# Patient Record
Sex: FEMALE | Race: BLACK OR AFRICAN AMERICAN | Employment: FULL TIME | URBAN - METROPOLITAN AREA
[De-identification: names, ages, dates, MRNs, and addresses within clinical notes are randomized per-mention and may not be internally consistent; named-entity substitution may affect disease eponyms.]

---

## 2017-06-06 ENCOUNTER — OFFICE VISIT (OUTPATIENT)
Dept: FAMILY MEDICINE CLINIC | Age: 37
End: 2017-06-06
Payer: COMMERCIAL

## 2017-06-06 VITALS
DIASTOLIC BLOOD PRESSURE: 75 MMHG | WEIGHT: 195 LBS | BODY MASS INDEX: 33.89 KG/M2 | TEMPERATURE: 97.9 F | SYSTOLIC BLOOD PRESSURE: 102 MMHG | HEART RATE: 90 BPM

## 2017-06-06 DIAGNOSIS — I10 ESSENTIAL HYPERTENSION: Primary | ICD-10-CM

## 2017-06-06 DIAGNOSIS — F17.200 SMOKER: ICD-10-CM

## 2017-06-06 DIAGNOSIS — E87.6 HYPOKALEMIA: ICD-10-CM

## 2017-06-06 DIAGNOSIS — L60.0 INGROWN RIGHT BIG TOENAIL: ICD-10-CM

## 2017-06-06 PROCEDURE — 99214 OFFICE O/P EST MOD 30 MIN: CPT | Performed by: FAMILY MEDICINE

## 2017-06-06 RX ORDER — LISINOPRIL AND HYDROCHLOROTHIAZIDE 20; 12.5 MG/1; MG/1
1 TABLET ORAL DAILY
Qty: 30 TABLET | Refills: 1 | Status: SHIPPED | OUTPATIENT
Start: 2017-06-06 | End: 2017-10-03 | Stop reason: SDUPTHER

## 2017-06-06 ASSESSMENT — ENCOUNTER SYMPTOMS
ABDOMINAL PAIN: 0
BACK PAIN: 0
SHORTNESS OF BREATH: 0

## 2017-07-11 ENCOUNTER — TELEPHONE (OUTPATIENT)
Dept: FAMILY MEDICINE CLINIC | Age: 37
End: 2017-07-11

## 2017-10-03 ENCOUNTER — OFFICE VISIT (OUTPATIENT)
Dept: FAMILY MEDICINE CLINIC | Age: 37
End: 2017-10-03
Payer: COMMERCIAL

## 2017-10-03 VITALS
WEIGHT: 197.4 LBS | BODY MASS INDEX: 33.7 KG/M2 | TEMPERATURE: 97.5 F | DIASTOLIC BLOOD PRESSURE: 86 MMHG | HEIGHT: 64 IN | HEART RATE: 85 BPM | SYSTOLIC BLOOD PRESSURE: 118 MMHG

## 2017-10-03 DIAGNOSIS — I10 ESSENTIAL HYPERTENSION: Primary | ICD-10-CM

## 2017-10-03 DIAGNOSIS — Z56.89 WORK-RELATED CONDITION: ICD-10-CM

## 2017-10-03 DIAGNOSIS — Z23 NEEDS FLU SHOT: ICD-10-CM

## 2017-10-03 PROCEDURE — 90471 IMMUNIZATION ADMIN: CPT | Performed by: FAMILY MEDICINE

## 2017-10-03 PROCEDURE — 99213 OFFICE O/P EST LOW 20 MIN: CPT | Performed by: FAMILY MEDICINE

## 2017-10-03 PROCEDURE — 90688 IIV4 VACCINE SPLT 0.5 ML IM: CPT | Performed by: FAMILY MEDICINE

## 2017-10-03 RX ORDER — LISINOPRIL AND HYDROCHLOROTHIAZIDE 20; 12.5 MG/1; MG/1
1 TABLET ORAL DAILY
Qty: 30 TABLET | Refills: 1 | Status: SHIPPED | OUTPATIENT
Start: 2017-10-03 | End: 2018-01-05 | Stop reason: SDUPTHER

## 2017-10-03 RX ORDER — IBUPROFEN 800 MG/1
800 TABLET ORAL EVERY 8 HOURS PRN
Qty: 40 TABLET | Refills: 1 | Status: CANCELLED | OUTPATIENT
Start: 2017-10-03

## 2017-10-03 RX ORDER — ACETAMINOPHEN 500 MG
500 TABLET ORAL EVERY 6 HOURS PRN
Qty: 60 TABLET | Refills: 1 | Status: SHIPPED | OUTPATIENT
Start: 2017-10-03 | End: 2019-01-16 | Stop reason: SDUPTHER

## 2017-10-03 ASSESSMENT — PATIENT HEALTH QUESTIONNAIRE - PHQ9
SUM OF ALL RESPONSES TO PHQ9 QUESTIONS 1 & 2: 0
2. FEELING DOWN, DEPRESSED OR HOPELESS: 0
1. LITTLE INTEREST OR PLEASURE IN DOING THINGS: 0
SUM OF ALL RESPONSES TO PHQ QUESTIONS 1-9: 0

## 2017-10-03 ASSESSMENT — ENCOUNTER SYMPTOMS
DIARRHEA: 0
NAUSEA: 0
WHEEZING: 0
ABDOMINAL PAIN: 0
CONSTIPATION: 0
VOMITING: 0
SHORTNESS OF BREATH: 0
BLURRED VISION: 0
COUGH: 0

## 2017-10-03 NOTE — MR AVS SNAPSHOT
After Visit Summary             Brittney Lawson   10/3/2017 10:15 AM   Office Visit    Description:  Female : 1980   Provider:  Shavon Phelps MD   Department:  [facility]              Your Follow-Up and Future Appointments         Below is a list of your follow-up and future appointments. This may not be a complete list as you may have made appointments directly with providers that we are not aware of or your providers may have made some for you. Please call your providers to confirm appointments. It is important to keep your appointments. Please bring your current insurance card, photo ID, co-pay, and all medication bottles to your appointment. If self-pay, payment is expected at the time of service. Your To-Do List     Follow-Up    Return in about 4 months (around 2/3/2018) for follow up HTN. Information from Your Visit        Department     Name Address Phone Fax    [address] [phone]      You Were Seen for:         Comments    Essential hypertension   [[id number]]         Vital Signs     Blood Pressure Pulse Temperature Height    118/86 (Site: Left Arm, Position: Sitting, Cuff Size: Medium Adult) 85 97.5 °F (36.4 °C) (Temporal) 5' 3.78\" (1.62 m)    Weight Last Menstrual Period Body Mass Index Smoking Status    197 lb 6.4 oz (89.5 kg) 2015 34.12 kg/m2 Current Some Day Smoker      Additional Information about your Body Mass Index (BMI)           Your BMI as listed above is considered obese (30 or more). BMI is an estimate of body fat, calculated from your height and weight. The higher your BMI, the greater your risk of heart disease, high blood pressure, type 2 diabetes, stroke, gallstones, arthritis, sleep apnea, and certain cancers. BMI is not perfect. It may overestimate body fat in athletes and people who are more muscular.   Even a small weight loss (between 5 and 10 percent of your current weight) by decreasing your calorie intake and becoming more physically active will help lower your risk of developing or worsening diseases associated with obesity. Learn more at: [email or web address]          Instructions    Thank you for letting us take care of you today. We hope all your questions were addressed. If a question was overlooked or something else comes to mind after you return home, please contact a member of your Care Team listed below. Please make sure you have a routine office visit set up to follow-up on [address]. Your Care Team at [place] [address] is Team #2  Krystal Stanley DO (Faculty)  Josiah Naylor MD (Resindent)  Eryn Tinoco MD (Resident)  Samir Connell MD (Resident)  Doug Gimenez MD (Resident)  Susan Colmenares MD (Resident)  Moe Cisneros LPRON Whitten., [facility]  Lillian Harris, [facility]  Cuate Coyne ([address] [facility])  Kasia Jefferson RN, ([address] [facility])  Henrry Washington, Ph.D., (Behavioral Services)  Chun Bhandari [facility] (Clinical Pharmacist)     Office phone number: [phone]    If you need to get in right away due to illness, please be advised we have \"Same Day\" appointments available Monday-Friday. Please call us at [phone] option #1 to schedule your \"Same Day\" appointment. Today's Medication Changes          These changes are accurate as of: 10/3/17 11:08 AM.  If you have any questions, ask your nurse or doctor. START taking these medications           acetaminophen 500 MG tablet   Commonly known as:  APAP EXTRA STRENGTH   Instructions:   Take 1 tablet by mouth every 6 hours as needed for Pain   Quantity:  60 tablet   Refills:  1   Started by:  Sapphire Handy MD            Where to Get Your Medications      These medications were sent to [facility], [address] [place] [address] [address] [place] [address]  [address], [address] [address] Phone:  [phone]     acetaminophen 500 MG tablet    lisinopril-hydrochlorothiazide 20-12.5 MG per tablet               Your Current Medications Are              lisinopril-hydrochlorothiazide (PRINZIDE;ZESTORETIC) 20-12.5 MG per tablet Take 1 tablet by mouth daily    acetaminophen (APAP EXTRA STRENGTH) 500 MG tablet Take 1 tablet by mouth every 6 hours as needed for Pain    ibuprofen (ADVIL;MOTRIN) 800 MG tablet Take 1 tablet by mouth every 8 hours as needed for Pain      Allergies           No Known Allergies      We Ordered/Performed the following           INFLUENZA, QUADV, 6 MO AND OLDER, IM, MDV, 0.5ML (FLULAVAL QUADV)          Additional Information        Basic Information     Date Of Birth Sex Race Ethnicity Preferred Language    1980 Female Black Non-/Non  English      Problem List as of 10/3/2017  Date Reviewed: 8/23/2016                Enlarged uterus    Uterine Polyps    Abnormal uterine bleeding (AUB)    Elevated blood pressure reading without diagnosis of hypertension    Fibroids    Dysmenorrhea    Thyromegaly    Obesity (BMI 30.0-34. 9)    Hypokalemia    Endometriosis, stage 4    Tobacco use    HTN (hypertension)    Menometrorrhagia      Immunizations as of 10/3/2017     Name Date    Influenza Virus Vaccine 11/24/2015    Tdap (Boostrix, Adacel) 3/17/2015      Preventive Care        Date Due    Yearly Flu Vaccine (1) 9/1/2017    Pneumococcal Vaccine - Pneumovax for adults aged 19-64 years with: chronic heart disease, chronic lung disease, diabetes mellitus, alcoholism, chronic liver disease, or cigarette smoking. (1 of 1 - PPSV23) 12/6/2017 (Originally 1/23/2000)    HIV screening is recommended for all people regardless of risk factors  aged 15-65 years at least once (lifetime) who have never been HIV tested.  12/6/2017 (Originally 1/23/1996)    Tetanus Combination Vaccine (2 - Td) 3/17/2025            [facility] Signup

## 2017-10-03 NOTE — PROGRESS NOTES
Visit Information    Have you changed or started any medications since your last visit including any over-the-counter medicines, vitamins, or herbal medicines? no   Have you stopped taking any of your medications? Is so, why? -  no  Are you having any side effects from any of your medications? - no    Have you seen any other physician or provider since your last visit?  no   Have you had any other diagnostic tests since your last visit?  no   Have you been seen in the emergency room and/or had an admission in a hospital since we last saw you?  yes [facility]   Have you had your routine dental cleaning in the past 6 months?  yes - May     Do you have an active MyChart account? If no, what is the barrier?   No: Declined    Patient Care Team:  Nelson Miranda MD as PCP - General (Family Medicine)  Al Echeverria MD as Consulting Physician (Obstetrics & Gynecology)    Medical History Review  Past Medical, Family, and Social History reviewed and does not contribute to the patient presenting condition    Health Maintenance   Topic Date Due    Flu vaccine (1) 09/01/2017    Pneumococcal med risk (1 of 1 - PPSV23) 12/06/2017 (Originally 1/23/2000)    HIV screen  12/06/2017 (Originally 1/23/1996)    DTaP/Tdap/Td vaccine (2 - Td) 03/17/2025

## 2017-10-03 NOTE — PROGRESS NOTES
Attending Physician Statement  I have discussed the care of Terry Ferrara, 40 y.o. female,including pertinent history and exam findings,  with the resident Dr. Macy Benítez MD.  History:  Chief Complaint   Patient presents with    Muscle Pain     Generlized pain all over body. Patient states her job is very physical.      41 yo female here with complaints of generalized body aches. Reports excessive work hours and physical exertion. I have reviewed the key elements of the encounter with the resident. Examination was done by resident as documented in residents note. BP Readings from Last 3 Encounters:   10/03/17 118/86   06/06/17 102/75   08/23/16 116/78     /86 (Site: Left Arm, Position: Sitting, Cuff Size: Medium Adult)  Pulse 85  Temp 97.5 °F (36.4 °C) (Temporal)   Ht 5' 3.78\" (1.62 m)  Wt 197 lb 6.4 oz (89.5 kg)  LMP 05/17/2015  BMI 34.12 kg/m2  Lab Results   Component Value Date    WBC 10.9 11/20/2015    HGB 13.1 11/20/2015    HCT 40.1 11/20/2015     11/20/2015    ALT 18 02/06/2015    AST 15 02/06/2015     (H) 11/20/2015    K 3.5 (L) 11/20/2015     11/20/2015    CREATININE 0.54 11/20/2015    BUN 14 11/20/2015    CO2 27 11/20/2015    TSH 1.17 08/12/2014    INR 0.9 08/12/2014    LABA1C 5.3 08/12/2014    LABMICR 14 11/20/2015     Lab Results   Component Value Date    CALCIUM 10.1 11/20/2015     No results found for: LDLCALC, LDLCHOLESTEROL, LDLDIRECT  I agree with the assessment, plan and diagnosis of   1. Essential hypertension  lisinopril-hydrochlorothiazide (PRINZIDE;ZESTORETIC) 20-12.5 MG per tablet   2. Needs flu shot     3. Work-related condition       I agree with  orders as documented by the resident. Recommendations:   Patient's exam is benign, counseled regarding supportive treatment and follow up if sx persist to pursue labs/ work up. BP is controlled, continue current regimen and provide refills. Return in about 4 months (around 2/3/2018) for follow up HTN. ([facility] ) Dr. Leisa Mckinney MD

## 2017-10-03 NOTE — PROGRESS NOTES
Subjective:    Anselmo Fritz is a 40 y.o. female with  has a past medical history of Abdominal pain; Fibroids; HTN (hypertension); Menometrorrhagia; and Obesity (BMI 30.0-34.9). HPI Patient is 40year old female with a past medical history of HTN who presents with pain in her hands, legs, feet and lower back. 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 She states she has been working long hours for the past 1 month, including 19 consecutive days. No reported trauma. She has had mild relief with OTC pain medications such as Tylenol and Alieve. Patient is also compliant with her BP medications. No reported symptoms of headaches, visual disturbances, chest pain or SOB. Review of Systems   Constitutional: Negative for chills and fever. Eyes: Negative for blurred vision. Respiratory: Negative for cough, shortness of breath and wheezing. Cardiovascular: Negative for chest pain. Gastrointestinal: Negative for abdominal pain, constipation, diarrhea, nausea and vomiting. Musculoskeletal: Positive for joint pain. Neurological: Negative for dizziness and headaches. Objective:    /86 (Site: Left Arm, Position: Sitting, Cuff Size: Medium Adult)  Pulse 85  Temp 97.5 °F (36.4 °C) (Temporal)   Ht 5' 3.78\" (1.62 m)  Wt 197 lb 6.4 oz (89.5 kg)  LMP 05/17/2015  BMI 34.12 kg/m2   BP Readings from Last 3 Encounters:   10/03/17 118/86   06/06/17 102/75   08/23/16 116/78     Physical Exam   Constitutional: She is oriented to person, place, and time and well-developed, well-nourished, and in no distress. No distress. HENT:   Head: Normocephalic and atraumatic. Eyes: Pupils are equal, round, and reactive to light. Cardiovascular: Normal rate, regular rhythm, normal heart sounds and intact distal pulses. No murmur heard. Pulmonary/Chest: Effort normal and breath sounds normal. She has no wheezes. Lymphadenopathy:     She has no cervical adenopathy.    Neurological: She is alert and oriented to person, place, and time. Skin: She is not diaphoretic. Nursing note and vitals reviewed. BP Readings from Last 3 Encounters:   10/03/17 118/86   06/06/17 102/75   08/23/16 116/78     /86 (Site: Left Arm, Position: Sitting, Cuff Size: Medium Adult)  Pulse 85  Temp 97.5 °F (36.4 °C) (Temporal)   Ht 5' 3.78\" (1.62 m)  Wt 197 lb 6.4 oz (89.5 kg)  LMP 05/17/2015  BMI 34.12 kg/m2  Lab Results   Component Value Date    WBC 10.9 11/20/2015    HGB 13.1 11/20/2015    HCT 40.1 11/20/2015     11/20/2015    ALT 18 02/06/2015    AST 15 02/06/2015     (H) 11/20/2015    K 3.5 (L) 11/20/2015     11/20/2015    CREATININE 0.54 11/20/2015    BUN 14 11/20/2015    CO2 27 11/20/2015    TSH 1.17 08/12/2014    INR 0.9 08/12/2014    LABA1C 5.3 08/12/2014    LABMICR 14 11/20/2015     Lab Results   Component Value Date    CALCIUM 10.1 11/20/2015     No results found for: LDLCALC, LDLCHOLESTEROL, LDLDIRECT    Assessment and Plan:    1. Essential hypertension  - condition stable; continue current medical management with medication listed below  - lisinopril-hydrochlorothiazide (PRINZIDE;ZESTORETIC) 20-12.5 MG per tablet; Take 1 tablet by mouth daily  Dispense: 30 tablet; Refill: 1    2. Needs flu shot  - flu shot given in office today    3. Work-related condition  - will treat pain with prn Tylenol ES; avoid NSAIDs due to HTN  - work restriction is another option which was discussed with the patient; she states she will consider it      Fermin Habermann received counseling on the following healthy behaviors: nutrition, exercise and medication adherence  Reviewed prior labs and health maintenance. Continue current medications, diet and exercise. Discussed use, benefit, and side effects of prescribed medications. Barriers to medication compliance addressed. Patient given educational materials - see patient instructions. All patient questions answered. Patient voiced understanding.      Requested Prescriptions Signed Prescriptions Disp Refills    lisinopril-hydrochlorothiazide (PRINZIDE;ZESTORETIC) 20-12.5 MG per tablet 30 tablet 1     Sig: Take 1 tablet by mouth daily    acetaminophen (APAP EXTRA STRENGTH) 500 MG tablet 60 tablet 1     Sig: Take 1 tablet by mouth every 6 hours as needed for Pain       Medications Discontinued During This Encounter   Medication Reason    lisinopril-hydrochlorothiazide (PRINZIDE;ZESTORETIC) 20-12.5 MG per tablet Reorder

## 2018-01-05 DIAGNOSIS — I10 ESSENTIAL HYPERTENSION: ICD-10-CM

## 2018-01-05 RX ORDER — LISINOPRIL AND HYDROCHLOROTHIAZIDE 20; 12.5 MG/1; MG/1
TABLET ORAL
Qty: 30 TABLET | Refills: 0 | Status: SHIPPED | OUTPATIENT
Start: 2018-01-05 | End: 2018-02-20 | Stop reason: SDUPTHER

## 2018-02-20 ENCOUNTER — OFFICE VISIT (OUTPATIENT)
Dept: FAMILY MEDICINE CLINIC | Age: 38
End: 2018-02-20
Payer: COMMERCIAL

## 2018-02-20 VITALS
SYSTOLIC BLOOD PRESSURE: 118 MMHG | WEIGHT: 201 LBS | HEIGHT: 64 IN | HEART RATE: 68 BPM | BODY MASS INDEX: 34.31 KG/M2 | DIASTOLIC BLOOD PRESSURE: 80 MMHG | TEMPERATURE: 97.7 F

## 2018-02-20 DIAGNOSIS — Z23 NEED FOR VACCINATION: ICD-10-CM

## 2018-02-20 DIAGNOSIS — Z77.011 EXPOSURE TO LEAD: ICD-10-CM

## 2018-02-20 DIAGNOSIS — R53.83 FATIGUE, UNSPECIFIED TYPE: ICD-10-CM

## 2018-02-20 DIAGNOSIS — I10 ESSENTIAL HYPERTENSION: Primary | ICD-10-CM

## 2018-02-20 DIAGNOSIS — M79.642 PAIN IN BOTH HANDS: ICD-10-CM

## 2018-02-20 DIAGNOSIS — M79.641 PAIN IN BOTH HANDS: ICD-10-CM

## 2018-02-20 PROCEDURE — 90471 IMMUNIZATION ADMIN: CPT | Performed by: FAMILY MEDICINE

## 2018-02-20 PROCEDURE — G8427 DOCREV CUR MEDS BY ELIG CLIN: HCPCS | Performed by: FAMILY MEDICINE

## 2018-02-20 PROCEDURE — G8417 CALC BMI ABV UP PARAM F/U: HCPCS | Performed by: FAMILY MEDICINE

## 2018-02-20 PROCEDURE — 4004F PT TOBACCO SCREEN RCVD TLK: CPT | Performed by: FAMILY MEDICINE

## 2018-02-20 PROCEDURE — 90732 PPSV23 VACC 2 YRS+ SUBQ/IM: CPT | Performed by: FAMILY MEDICINE

## 2018-02-20 PROCEDURE — 99213 OFFICE O/P EST LOW 20 MIN: CPT | Performed by: FAMILY MEDICINE

## 2018-02-20 PROCEDURE — G8484 FLU IMMUNIZE NO ADMIN: HCPCS | Performed by: FAMILY MEDICINE

## 2018-02-20 RX ORDER — LISINOPRIL AND HYDROCHLOROTHIAZIDE 20; 12.5 MG/1; MG/1
TABLET ORAL
Qty: 30 TABLET | Refills: 3 | Status: SHIPPED | OUTPATIENT
Start: 2018-02-20 | End: 2018-07-28 | Stop reason: SDUPTHER

## 2018-02-20 ASSESSMENT — ENCOUNTER SYMPTOMS
VOMITING: 0
NAUSEA: 0
COUGH: 0
BLURRED VISION: 0
SHORTNESS OF BREATH: 0
ABDOMINAL PAIN: 0
DIARRHEA: 0
CONSTIPATION: 0
WHEEZING: 0

## 2018-02-20 NOTE — PROGRESS NOTES
sounds and intact distal pulses. No murmur heard. Pulmonary/Chest: Effort normal and breath sounds normal. She has no wheezes. Musculoskeletal:        Right hand: She exhibits bony tenderness. Normal sensation noted. Normal strength noted. Left hand: She exhibits bony tenderness. Normal sensation noted. Normal strength noted. Lymphadenopathy:     She has no cervical adenopathy. Neurological: She is alert and oriented to person, place, and time. Skin: She is not diaphoretic. Nursing note and vitals reviewed. BP Readings from Last 3 Encounters:   02/20/18 118/80   10/03/17 118/86   06/06/17 102/75     /80 (Site: Left Arm, Position: Sitting, Cuff Size: Medium Adult) Comment: Machine  Pulse 68   Temp 97.7 °F (36.5 °C) (Temporal)   Ht 5' 3.78\" (1.62 m)   Wt 201 lb (91.2 kg)   LMP 05/17/2015 Comment: blood hcg negative today  BMI 34.74 kg/m²   Lab Results   Component Value Date    WBC 10.9 11/20/2015    HGB 13.1 11/20/2015    HCT 40.1 11/20/2015     11/20/2015    ALT 18 02/06/2015    AST 15 02/06/2015     (H) 11/20/2015    K 3.5 (L) 11/20/2015     11/20/2015    CREATININE 0.54 11/20/2015    BUN 14 11/20/2015    CO2 27 11/20/2015    TSH 1.17 08/12/2014    INR 0.9 08/12/2014    LABA1C 5.3 08/12/2014    LABMICR 14 11/20/2015     Lab Results   Component Value Date    CALCIUM 10.1 11/20/2015     No results found for: LDLCALC, LDLCHOLESTEROL, LDLDIRECT    Assessment and Plan:    1. Essential hypertension  Blood pressure goal for this patient is less than 130/80. Blood pressure is currently at goal; continue current medical management with Zestoretic 20-12 0.5 mg daily. We'll order BMP to check serum creatinine and serum potassium. 2. Pain in both hands  We'll order x-rays of both hands and refer patient to physical therapy. 3. Fatigue, unspecified type  CBC from November 2015 shows normal hemoglobin but microcytosis.   Iron studies done at that time she were within

## 2018-02-20 NOTE — PATIENT INSTRUCTIONS
Thank you for letting us take care of you today. We hope all your questions were addressed. If a question was overlooked or something else comes to mind after you return home, please contact a member of your Care Team listed below. Please make sure you have a routine office visit set up to follow-up on [address]. Your Care Team at [place] [address] is Team #2  Patrick Peterson DO (Faculty)  Linden Stone MD (Resident)  Piyush Nguyen MD (Resident)  Catalino Coto MD (Resident)  Rivka Renteria MD (Resident)  Fe Royal MD (Resident)  STACY Tucker., A  Kurt Olvera, GABINO Rodriguez ([address] [facility])  Cher Ruiz RN, ([address] [facility])  Zoila Ibanez, Ph.D., (Behavioral Services)  George Walker [facility] (Clinical Pharmacist)     Office phone number: [phone]    If you need to get in right away due to illness, please be advised we have \"Same Day\" appointments available Monday-Friday. Please call us at [phone] option #1 to schedule your \"Same Day\" appointment. OctoberPatient Education        High Blood Pressure: Care Instructions  Your Care Instructions    If your blood pressure is usually above 140/90, you have high blood pressure, or hypertension. That means the top number is 140 or higher or the bottom number is 90 or higher, or both. Despite what a lot of people think, high blood pressure usually doesn't cause headaches or make you feel dizzy or lightheaded. It usually has no symptoms. But it does increase your risk for heart attack, stroke, and kidney or eye damage. The higher your blood pressure, the more your risk increases. Your doctor will give you a goal for your blood pressure. Your goal will be based on your health and your age. An example of a goal is to keep your blood pressure below 140/90. Lifestyle changes, such as eating healthy and being active, are always important to help lower blood pressure.  You might also take medicine to reach your grains, and low-fat dairy products. · Lower the amount of saturated fat in your diet. Saturated fat is found in animal products such as milk, cheese, and meat. Limiting these foods may help you lose weight and also lower your risk for heart disease. · Do not smoke. Smoking increases your risk for heart attack and stroke. If you need help quitting, talk to your doctor about stop-smoking programs and medicines. These can increase your chances of quitting for good. When should you call for help? Call 911 anytime you think you may need emergency care. This may mean having symptoms that suggest that your blood pressure is causing a serious heart or blood vessel problem. Your blood pressure may be over 180/110. ? For example, call 911 if:  ? · You have symptoms of a heart attack. These may include:  ¨ Chest pain or pressure, or a strange feeling in the chest.  ¨ Sweating. ¨ Shortness of breath. ¨ Nausea or vomiting. ¨ Pain, pressure, or a strange feeling in the back, neck, jaw, or upper belly or in one or both shoulders or arms. ¨ Lightheadedness or sudden weakness. ¨ A fast or irregular heartbeat. ? · You have symptoms of a stroke. These may include:  ¨ Sudden numbness, tingling, weakness, or loss of movement in your face, arm, or leg, especially on only one side of your body. ¨ Sudden vision changes. ¨ Sudden trouble speaking. ¨ Sudden confusion or trouble understanding simple statements. ¨ Sudden problems with walking or balance. ¨ A sudden, severe headache that is different from past headaches. ? · You have severe back or belly pain. ?Do not wait until your blood pressure comes down on its own. Get help right away. ?Call your doctor now or seek immediate care if:  ? · Your blood pressure is much higher than normal (such as 180/110 or higher), but you don't have symptoms. ? · You think high blood pressure is causing symptoms, such as:  ¨ Severe headache. ¨ Blurry vision. ? Watch closely for changes in your health, and be sure to contact your doctor if:  ? · Your blood pressure measures 140/90 or higher at least 2 times. That means the top number is 140 or higher or the bottom number is 90 or higher, or both. ? · You think you may be having side effects from your blood pressure medicine. ? · Your blood pressure is usually normal, but it goes above normal at least 2 times. Where can you learn more? Go to https://[email or web address].[email or web address] org and sign in to your [employer] account. Enter V451 in the It[employer]ix box to learn more about \"High Blood Pressure: Care Instructions. \"     If you do not have an account, please click on the \"Sign Up Now\" link. Current as of: Nydia 10, 2017  Content Version: 11.5  © 3527-9173 Healthwise, Incorporated. Care instructions adapted under license by [facility] ([facility]). If you have questions about a medical condition or this instruction, always ask your healthcare professional. [address] any warranty or liability for your use of this information.

## 2018-02-21 ENCOUNTER — TELEPHONE (OUTPATIENT)
Dept: FAMILY MEDICINE CLINIC | Age: 38
End: 2018-02-21

## 2018-02-21 DIAGNOSIS — M79.641 PAIN IN BOTH HANDS: Primary | ICD-10-CM

## 2018-02-21 DIAGNOSIS — M79.642 PAIN IN BOTH HANDS: Primary | ICD-10-CM

## 2018-07-28 DIAGNOSIS — I10 ESSENTIAL HYPERTENSION: ICD-10-CM

## 2018-07-30 RX ORDER — LISINOPRIL AND HYDROCHLOROTHIAZIDE 20; 12.5 MG/1; MG/1
TABLET ORAL
Qty: 30 TABLET | Refills: 0 | Status: SHIPPED | OUTPATIENT
Start: 2018-07-30 | End: 2018-10-23 | Stop reason: SDUPTHER

## 2018-07-30 NOTE — TELEPHONE ENCOUNTER
Patient needs to make an appointment. Not seen since [place]. No more refills until seen in office. Please inform patient.  Thanks and have a nice day. - Dr. Shawanda Chang

## 2018-07-30 NOTE — TELEPHONE ENCOUNTER
Please address the medication refill and close the encounter. If I can be of assistance, please route to the applicable pool. Thank you. Next Visit Date:  No future appointments. Health Maintenance   Topic Date Due    HIV screen  01/23/1996    Potassium monitoring  11/20/2016    Creatinine monitoring  11/20/2016    Flu vaccine (1) 09/01/2018    DTaP/Tdap/Td vaccine (2 - Td) 03/17/2025    Pneumococcal med risk  Completed       Hemoglobin A1C (%)   Date Value   08/12/2014 5.3             ( goal A1C is < 7)   Microalb/Crt. Ratio (mcg/mg creat)   Date Value   11/20/2015 14     No results found for: LDLCHOLESTEROL, LDLCALC    (goal LDL is <100)   AST (U/L)   Date Value   02/06/2015 15     ALT (U/L)   Date Value   02/06/2015 18     BUN (mg/dL)   Date Value   11/20/2015 14     BP Readings from Last 3 Encounters:   02/20/18 118/80   10/03/17 118/86   06/06/17 102/75          (goal 120/80)    All Future Testing planned in [facility]  Lab Frequency Next Occurrence   CBC Auto Differential Once 69/93/8247   Basic Metabolic Panel Once 56/67/7860   XR HAND RIGHT (MIN 3 VIEWS) Once 07/17/2018   Lead, Blood Once 02/20/2019   XR HAND LEFT (MIN 3 VIEWS) Once 07/17/2018               Patient Active Problem List:     Fibroids     Dysmenorrhea     Menometrorrhagia     HTN (hypertension)     Tobacco use     Obesity (BMI 30.0-34. 9)     Enlarged uterus     Uterine Polyps     Abnormal uterine bleeding (AUB)     Hypokalemia     Endometriosis, stage 4     Thyromegaly

## 2018-10-23 ENCOUNTER — TELEPHONE (OUTPATIENT)
Dept: ADMINISTRATIVE | Age: 38
End: 2018-10-23

## 2018-10-23 DIAGNOSIS — I10 ESSENTIAL HYPERTENSION: ICD-10-CM

## 2018-10-23 RX ORDER — LISINOPRIL AND HYDROCHLOROTHIAZIDE 20; 12.5 MG/1; MG/1
TABLET ORAL
Qty: 30 TABLET | Refills: 0 | Status: SHIPPED | OUTPATIENT
Start: 2018-10-23 | End: 2018-12-15 | Stop reason: SDUPTHER

## 2018-10-23 NOTE — TELEPHONE ENCOUNTER
Patient called, set up appointment for 11/7. States she is out of her blood pressure meds and is having headaches as a result. Asked that office approve script to get her through to appointment date. Please contact patient and let her know if script can be filled. Her phone is [phone].

## 2018-11-07 ENCOUNTER — OFFICE VISIT (OUTPATIENT)
Dept: FAMILY MEDICINE CLINIC | Age: 38
End: 2018-11-07
Payer: COMMERCIAL

## 2018-11-07 VITALS
WEIGHT: 192.2 LBS | HEIGHT: 63 IN | HEART RATE: 86 BPM | BODY MASS INDEX: 34.05 KG/M2 | SYSTOLIC BLOOD PRESSURE: 120 MMHG | DIASTOLIC BLOOD PRESSURE: 90 MMHG | TEMPERATURE: 97.4 F

## 2018-11-07 DIAGNOSIS — G56.03 BILATERAL CARPAL TUNNEL SYNDROME: Primary | ICD-10-CM

## 2018-11-07 DIAGNOSIS — Z23 NEED FOR VACCINATION: ICD-10-CM

## 2018-11-07 PROCEDURE — 99213 OFFICE O/P EST LOW 20 MIN: CPT | Performed by: FAMILY MEDICINE

## 2018-11-07 PROCEDURE — 90686 IIV4 VACC NO PRSV 0.5 ML IM: CPT | Performed by: FAMILY MEDICINE

## 2018-11-07 ASSESSMENT — ENCOUNTER SYMPTOMS
BACK PAIN: 1
SHORTNESS OF BREATH: 0
ABDOMINAL PAIN: 0
VOMITING: 0
DIARRHEA: 0
COUGH: 0
CONSTIPATION: 0
SORE THROAT: 0
NAUSEA: 0

## 2018-11-07 ASSESSMENT — PATIENT HEALTH QUESTIONNAIRE - PHQ9
SUM OF ALL RESPONSES TO PHQ QUESTIONS 1-9: 0
SUM OF ALL RESPONSES TO PHQ QUESTIONS 1-9: 0
2. FEELING DOWN, DEPRESSED OR HOPELESS: 0
SUM OF ALL RESPONSES TO PHQ9 QUESTIONS 1 & 2: 0
1. LITTLE INTEREST OR PLEASURE IN DOING THINGS: 0

## 2018-11-07 NOTE — PROGRESS NOTES
Subjective:   Jinny Skaggs is a 41 y.o. female with  has a past medical history of Abdominal pain; Bilateral carpal tunnel syndrome; Fibroids; HTN (hypertension); Menometrorrhagia; and Obesity (BMI 30.0-34.9). HPI   Patient presents with several pain complaints. Main concern is pain, numbness and tingling of both hands. Patient believes it is work related. Numbness and tingling wake her up from sleep at night. No reported injury or trauma. Patient also having pain in neck and back, also most likely work related. Review of Systems   Constitutional: Negative for chills and fever. HENT: Negative for sore throat. Eyes: Negative for visual disturbance. Respiratory: Negative for cough and shortness of breath. Cardiovascular: Negative for chest pain. Gastrointestinal: Negative for abdominal pain, constipation, diarrhea, nausea and vomiting. Genitourinary: Negative for dysuria. Musculoskeletal: Positive for back pain and neck pain. Numbness and tingling of both hands and wrists   Neurological: Negative for dizziness and headaches. Objective:    BP (!) 120/90 (Site: Left Upper Arm, Position: Sitting, Cuff Size: Medium Adult)   Pulse 86   Temp 97.4 °F (36.3 °C)   Ht 5' 3\" (1.6 m)   Wt 192 lb 3.2 oz (87.2 kg)   LMP 05/17/2015 Comment: blood hcg negative today  BMI 34.05 kg/m²    BP Readings from Last 3 Encounters:   11/07/18 (!) 120/90   02/20/18 118/80   10/03/17 118/86     Physical Exam   Constitutional: She is oriented to person, place, and time. No distress. HENT:   Head: Normocephalic and atraumatic. Cardiovascular: Normal rate, regular rhythm, normal heart sounds and intact distal pulses. No murmur heard. Pulmonary/Chest: Effort normal and breath sounds normal. She has no wheezes. Musculoskeletal: She exhibits no edema. Lymphadenopathy:     She has no cervical adenopathy. Neurological: She is alert and oriented to person, place, and time.    Skin: Capillary

## 2018-12-15 DIAGNOSIS — I10 ESSENTIAL HYPERTENSION: ICD-10-CM

## 2018-12-18 RX ORDER — LISINOPRIL AND HYDROCHLOROTHIAZIDE 20; 12.5 MG/1; MG/1
TABLET ORAL
Qty: 30 TABLET | Refills: 5 | Status: SHIPPED | OUTPATIENT
Start: 2018-12-18 | End: 2019-10-24 | Stop reason: SDUPTHER

## 2019-01-16 ENCOUNTER — OFFICE VISIT (OUTPATIENT)
Dept: FAMILY MEDICINE CLINIC | Age: 39
End: 2019-01-16
Payer: COMMERCIAL

## 2019-01-16 VITALS
SYSTOLIC BLOOD PRESSURE: 120 MMHG | DIASTOLIC BLOOD PRESSURE: 86 MMHG | WEIGHT: 194 LBS | HEART RATE: 86 BPM | BODY MASS INDEX: 34.38 KG/M2 | TEMPERATURE: 98 F | HEIGHT: 63 IN

## 2019-01-16 DIAGNOSIS — I10 ESSENTIAL HYPERTENSION: ICD-10-CM

## 2019-01-16 DIAGNOSIS — M25.551 RIGHT HIP PAIN: ICD-10-CM

## 2019-01-16 DIAGNOSIS — Z11.4 ENCOUNTER FOR SCREENING FOR HIV: ICD-10-CM

## 2019-01-16 DIAGNOSIS — G56.03 BILATERAL CARPAL TUNNEL SYNDROME: Primary | ICD-10-CM

## 2019-01-16 PROCEDURE — 99213 OFFICE O/P EST LOW 20 MIN: CPT | Performed by: FAMILY MEDICINE

## 2019-01-16 PROCEDURE — 99211 OFF/OP EST MAY X REQ PHY/QHP: CPT | Performed by: FAMILY MEDICINE

## 2019-01-16 RX ORDER — ACETAMINOPHEN 500 MG
500 TABLET ORAL EVERY 6 HOURS PRN
Qty: 60 TABLET | Refills: 1 | Status: SHIPPED | OUTPATIENT
Start: 2019-01-16 | End: 2022-03-03

## 2019-01-16 ASSESSMENT — ENCOUNTER SYMPTOMS
SORE THROAT: 0
DIARRHEA: 0
NAUSEA: 0
SHORTNESS OF BREATH: 0
CONSTIPATION: 0
VOMITING: 0
ABDOMINAL PAIN: 0
BACK PAIN: 0
COUGH: 0

## 2019-04-16 ENCOUNTER — HOSPITAL ENCOUNTER (OUTPATIENT)
Dept: OCCUPATIONAL THERAPY | Age: 39
Setting detail: THERAPIES SERIES
Discharge: HOME | End: 2019-04-16
Payer: COMMERCIAL

## 2019-04-16 NOTE — SIGNIFICANT EVENT
[x] [address] Occupational Therapy       [address] [facility], 1st [address]       Phone: [phone]       Fax: [phone] [] [facility] at [address] [place] [address]. [place], [place]       Phone: [phone]       Fax: [phone]          Occupational Therapy Cancel/No Show note    Date: 2019  Patient: Nicole Welsh  : 1980  MRN: [id number]  Cancels/No Shows to date:     For today's appointment patient:  []  Cancelled  []  Rescheduled appointment  [x]  No-show     Reason given by patient:  []  Patient ill  []  Conflicting appointment  []  No transportation    []  Conflict with work  []  No reason given  []  Other:     Comments: No showed for evaluation this date.       Electronically signed by: ADOLFO Banegas/MICHELLE, TIFFANY

## 2019-07-11 ENCOUNTER — OFFICE VISIT (OUTPATIENT)
Dept: FAMILY MEDICINE CLINIC | Age: 39
End: 2019-07-11
Payer: COMMERCIAL

## 2019-07-11 VITALS
HEART RATE: 81 BPM | TEMPERATURE: 96.9 F | BODY MASS INDEX: 35.54 KG/M2 | HEIGHT: 63 IN | WEIGHT: 200.6 LBS | SYSTOLIC BLOOD PRESSURE: 118 MMHG | DIASTOLIC BLOOD PRESSURE: 86 MMHG

## 2019-07-11 DIAGNOSIS — G56.03 BILATERAL CARPAL TUNNEL SYNDROME: Primary | ICD-10-CM

## 2019-07-11 DIAGNOSIS — M25.562 LEFT KNEE PAIN, UNSPECIFIED CHRONICITY: ICD-10-CM

## 2019-07-11 DIAGNOSIS — Z00.00 HEALTH CARE MAINTENANCE: ICD-10-CM

## 2019-07-11 PROCEDURE — 99213 OFFICE O/P EST LOW 20 MIN: CPT | Performed by: STUDENT IN AN ORGANIZED HEALTH CARE EDUCATION/TRAINING PROGRAM

## 2019-07-11 RX ORDER — GABAPENTIN 300 MG/1
300 CAPSULE ORAL 3 TIMES DAILY
Qty: 270 CAPSULE | Refills: 1 | Status: SHIPPED | OUTPATIENT
Start: 2019-07-11 | End: 2019-10-24

## 2019-07-11 RX ORDER — IBUPROFEN 600 MG/1
600 TABLET ORAL EVERY 8 HOURS PRN
Qty: 90 TABLET | Refills: 0 | Status: SHIPPED | OUTPATIENT
Start: 2019-07-11 | End: 2019-08-14 | Stop reason: SDUPTHER

## 2019-07-11 ASSESSMENT — ENCOUNTER SYMPTOMS
SORE THROAT: 0
VOMITING: 0
COUGH: 0
DIARRHEA: 0
ABDOMINAL PAIN: 0
CHEST TIGHTNESS: 0
SHORTNESS OF BREATH: 0
NAUSEA: 0
CONSTIPATION: 0

## 2019-08-14 DIAGNOSIS — M25.562 LEFT KNEE PAIN, UNSPECIFIED CHRONICITY: ICD-10-CM

## 2019-08-15 RX ORDER — IBUPROFEN 600 MG/1
TABLET ORAL
Qty: 90 TABLET | Refills: 0 | Status: SHIPPED | OUTPATIENT
Start: 2019-08-15 | End: 2019-10-24

## 2019-08-18 DIAGNOSIS — M25.562 LEFT KNEE PAIN, UNSPECIFIED CHRONICITY: ICD-10-CM

## 2019-08-19 RX ORDER — IBUPROFEN 600 MG/1
TABLET ORAL
Qty: 90 TABLET | Refills: 0 | Status: SHIPPED | OUTPATIENT
Start: 2019-08-19 | End: 2020-09-10 | Stop reason: SDUPTHER

## 2019-10-24 ENCOUNTER — OFFICE VISIT (OUTPATIENT)
Dept: FAMILY MEDICINE CLINIC | Age: 39
End: 2019-10-24
Payer: COMMERCIAL

## 2019-10-24 VITALS
DIASTOLIC BLOOD PRESSURE: 101 MMHG | SYSTOLIC BLOOD PRESSURE: 147 MMHG | BODY MASS INDEX: 34.91 KG/M2 | WEIGHT: 197 LBS | HEART RATE: 78 BPM | HEIGHT: 63 IN

## 2019-10-24 DIAGNOSIS — I10 ESSENTIAL HYPERTENSION: ICD-10-CM

## 2019-10-24 DIAGNOSIS — Z11.4 SCREENING FOR HIV WITHOUT PRESENCE OF RISK FACTORS: ICD-10-CM

## 2019-10-24 DIAGNOSIS — G56.03 BILATERAL CARPAL TUNNEL SYNDROME: Primary | ICD-10-CM

## 2019-10-24 PROCEDURE — 99213 OFFICE O/P EST LOW 20 MIN: CPT | Performed by: STUDENT IN AN ORGANIZED HEALTH CARE EDUCATION/TRAINING PROGRAM

## 2019-10-24 RX ORDER — LISINOPRIL AND HYDROCHLOROTHIAZIDE 20; 12.5 MG/1; MG/1
TABLET ORAL
Qty: 30 TABLET | Refills: 0 | Status: SHIPPED | OUTPATIENT
Start: 2019-10-24 | End: 2019-12-18

## 2019-10-24 RX ORDER — ACETAMINOPHEN 500 MG
500 TABLET ORAL 2 TIMES DAILY PRN
Qty: 120 TABLET | Refills: 0 | Status: SHIPPED | OUTPATIENT
Start: 2019-10-24 | End: 2020-04-17

## 2019-10-24 RX ORDER — GABAPENTIN 100 MG/1
100 CAPSULE ORAL 2 TIMES DAILY
Qty: 60 CAPSULE | Refills: 0 | Status: SHIPPED | OUTPATIENT
Start: 2019-10-24 | End: 2020-12-07

## 2019-10-24 ASSESSMENT — ENCOUNTER SYMPTOMS
COUGH: 0
DIARRHEA: 0
CONSTIPATION: 0
ABDOMINAL PAIN: 0
NAUSEA: 0
CHEST TIGHTNESS: 0
SORE THROAT: 0
VOMITING: 0
SHORTNESS OF BREATH: 0

## 2019-10-24 ASSESSMENT — PATIENT HEALTH QUESTIONNAIRE - PHQ9
SUM OF ALL RESPONSES TO PHQ QUESTIONS 1-9: 0
SUM OF ALL RESPONSES TO PHQ9 QUESTIONS 1 & 2: 0
1. LITTLE INTEREST OR PLEASURE IN DOING THINGS: 0
2. FEELING DOWN, DEPRESSED OR HOPELESS: 0
SUM OF ALL RESPONSES TO PHQ QUESTIONS 1-9: 0

## 2019-11-25 ENCOUNTER — TELEPHONE (OUTPATIENT)
Dept: FAMILY MEDICINE CLINIC | Age: 39
End: 2019-11-25

## 2019-12-18 DIAGNOSIS — I10 ESSENTIAL HYPERTENSION: ICD-10-CM

## 2019-12-18 DIAGNOSIS — I10 ESSENTIAL HYPERTENSION: Primary | ICD-10-CM

## 2019-12-18 RX ORDER — LISINOPRIL AND HYDROCHLOROTHIAZIDE 20; 12.5 MG/1; MG/1
TABLET ORAL
Qty: 30 TABLET | Refills: 0 | Status: SHIPPED | OUTPATIENT
Start: 2019-12-18 | End: 2020-02-28

## 2020-02-28 RX ORDER — LISINOPRIL AND HYDROCHLOROTHIAZIDE 20; 12.5 MG/1; MG/1
TABLET ORAL
Qty: 30 TABLET | Refills: 0 | Status: SHIPPED | OUTPATIENT
Start: 2020-02-28 | End: 2020-06-04

## 2020-03-18 ENCOUNTER — OFFICE VISIT (OUTPATIENT)
Dept: FAMILY MEDICINE CLINIC | Age: 40
End: 2020-03-18
Payer: COMMERCIAL

## 2020-03-18 VITALS
SYSTOLIC BLOOD PRESSURE: 117 MMHG | WEIGHT: 203 LBS | HEART RATE: 104 BPM | BODY MASS INDEX: 35.97 KG/M2 | DIASTOLIC BLOOD PRESSURE: 79 MMHG | HEIGHT: 63 IN

## 2020-03-18 PROCEDURE — 99213 OFFICE O/P EST LOW 20 MIN: CPT | Performed by: STUDENT IN AN ORGANIZED HEALTH CARE EDUCATION/TRAINING PROGRAM

## 2020-03-18 ASSESSMENT — ENCOUNTER SYMPTOMS
COLOR CHANGE: 0
ABDOMINAL PAIN: 0

## 2020-03-18 ASSESSMENT — PATIENT HEALTH QUESTIONNAIRE - PHQ9
SUM OF ALL RESPONSES TO PHQ QUESTIONS 1-9: 0
SUM OF ALL RESPONSES TO PHQ9 QUESTIONS 1 & 2: 0
1. LITTLE INTEREST OR PLEASURE IN DOING THINGS: 0
SUM OF ALL RESPONSES TO PHQ QUESTIONS 1-9: 0
2. FEELING DOWN, DEPRESSED OR HOPELESS: 0

## 2020-03-18 NOTE — PROGRESS NOTES
healthy behaviors: nutrition, exercise and medication adherence  Reviewed prior labs and health maintenance  Continue current medications, diet and exercise. Discussed use, benefit, and side effects of prescribed medications. Barriers to medication compliance addressed. Patient given educational materials - see patient instructions  Was a self-tracking handout given in paper form or via [facility]? No    Requested Prescriptions     Signed Prescriptions Disp Refills    Elastic Bandages & Supports (WRIST SPLINT ELASTIC/SMALL/MED) MISC 2 each 0     Sig: Apply to wrist nightly       All patient questions answered. Patient voiced understanding. Quality Measures    Body mass index is 35.96 kg/m². Elevated. Weight control planned discussed Healthy diet and regular exercise. BP: 117/79 Blood pressure is normal. Treatment plan consists of No treatment change needed. No results found for: LDLCALC, LDLCHOLESTEROL, LDLDIRECT (goal LDL reduction with dx if diabetes is 50% LDL reduction)      PHQ Scores 3/18/2020 10/24/2019 11/7/2018 10/3/2017   PHQ2 Score 0 0 0 0   PHQ9 Score 0 0 0 0     Interpretation of Total Score Depression Severity: 1-4 = Minimal depression, 5-9 = Mild depression, 10-14 = Moderate depression, 15-19 = Moderately severe depression, 20-27 = Severe depression    Requested Prescriptions     Signed Prescriptions Disp Refills    Elastic Bandages & Supports (WRIST SPLINT ELASTIC/SMALL/MED) MISC 2 each 0     Sig: Apply to wrist nightly       There are no discontinued medications. Tracie Musa received counseling on the following healthy behaviors: nutrition, exercise and medication adherence    Patient given educational materials : see patient instruction     Discussed use, benefit, and side effects of prescribed medications. Barriers to medication compliance addressed. All patient questions answered. Pt voiced understanding. Return in about 6 months (around 9/18/2020).

## 2020-03-18 NOTE — PATIENT INSTRUCTIONS
examples of exercises for you to try. The exercises may be suggested for a condition or for rehabilitation. Start each exercise slowly. Ease off the exercises if you start to have pain. You will be told when to start these exercises and which ones will work best for you. Warm-up stretches  When you no longer have pain or numbness, you can do exercises to help prevent carpal tunnel syndrome from coming back. Do not do any stretch or movement that is uncomfortable or painful. 1. Rotate your wrist up, down, and from side to side. Repeat 4 times. 2. Stretch your fingers far apart. Relax them, and then stretch them again. Repeat 4 times. 3. Stretch your thumb by pulling it back gently, holding it, and then releasing it. Repeat 4 times. How to do the exercises  Prayer stretch   1. Start with your palms together in front of your chest just below your chin. 2. Slowly lower your hands toward your waistline, keeping your hands close to your stomach and your palms together until you feel a mild to moderate stretch under your forearms. 3. Hold for at least 15 to 30 seconds. Repeat 2 to 4 times. Wrist flexor stretch   1. Extend your arm in front of you with your palm up. 2. Bend your wrist, pointing your hand toward the floor. 3. With your other hand, gently bend your wrist farther until you feel a mild to moderate stretch in your forearm. 4. Hold for at least 15 to 30 seconds. Repeat 2 to 4 times. Wrist extensor stretch   1. Repeat steps 1 through 4 of the stretch above, but begin with your extended hand palm down. Follow-up care is a key part of your treatment and safety. Be sure to make and go to all appointments, and call your doctor if you are having problems. It's also a good idea to know your test results and keep a list of the medicines you take. Where can you learn more? Go to https://chmónicaeb.WeHack.It. org and sign in to your mobiDEOS account.  Enter D214 in the [facility]

## 2020-06-04 RX ORDER — LISINOPRIL AND HYDROCHLOROTHIAZIDE 20; 12.5 MG/1; MG/1
TABLET ORAL
Qty: 30 TABLET | Refills: 0 | Status: SHIPPED | OUTPATIENT
Start: 2020-06-04 | End: 2020-08-26

## 2020-06-05 ENCOUNTER — TELEPHONE (OUTPATIENT)
Dept: FAMILY MEDICINE CLINIC | Age: 40
End: 2020-06-05

## 2020-06-05 NOTE — TELEPHONE ENCOUNTER
Call pt explaining,  that Dr Juan J Bee wants her to do blood work, a pending blood order in her chart. To take to the lab.

## 2020-06-12 ENCOUNTER — HOSPITAL ENCOUNTER (OUTPATIENT)
Age: 40
Setting detail: SPECIMEN
Discharge: HOME | End: 2020-06-12
Payer: COMMERCIAL

## 2020-06-12 LAB
ANION GAP SERPL CALCULATED.3IONS-SCNC: 13 MMOL/L (ref 9–17)
BUN BLDV-MCNC: 13 MG/DL (ref 6–20)
BUN/CREAT BLD: NORMAL (ref 9–20)
CALCIUM SERPL-MCNC: 9.4 MG/DL (ref 8.6–10.4)
CHLORIDE BLD-SCNC: 106 MMOL/L (ref 98–107)
CO2: 23 MMOL/L (ref 20–31)
CREAT SERPL-MCNC: 0.63 MG/DL (ref 0.5–0.9)
GFR AFRICAN AMERICAN: >60 ML/MIN
GFR NON-AFRICAN AMERICAN: >60 ML/MIN
GFR SERPL CREATININE-BSD FRML MDRD: NORMAL ML/MIN/{1.73_M2}
GFR SERPL CREATININE-BSD FRML MDRD: NORMAL ML/MIN/{1.73_M2}
GLUCOSE BLD-MCNC: 88 MG/DL (ref 70–99)
POTASSIUM SERPL-SCNC: 3.8 MMOL/L (ref 3.7–5.3)
SODIUM BLD-SCNC: 142 MMOL/L (ref 135–144)

## 2020-08-26 RX ORDER — LISINOPRIL AND HYDROCHLOROTHIAZIDE 20; 12.5 MG/1; MG/1
TABLET ORAL
Qty: 30 TABLET | Refills: 0 | Status: SHIPPED | OUTPATIENT
Start: 2020-08-26 | End: 2020-10-09

## 2020-09-10 ENCOUNTER — OFFICE VISIT (OUTPATIENT)
Dept: FAMILY MEDICINE CLINIC | Age: 40
End: 2020-09-10
Payer: COMMERCIAL

## 2020-09-10 VITALS
TEMPERATURE: 97.2 F | WEIGHT: 199 LBS | OXYGEN SATURATION: 99 % | HEART RATE: 81 BPM | BODY MASS INDEX: 35.25 KG/M2 | DIASTOLIC BLOOD PRESSURE: 95 MMHG | SYSTOLIC BLOOD PRESSURE: 134 MMHG

## 2020-09-10 PROCEDURE — 99211 OFF/OP EST MAY X REQ PHY/QHP: CPT | Performed by: FAMILY MEDICINE

## 2020-09-10 PROCEDURE — 99213 OFFICE O/P EST LOW 20 MIN: CPT | Performed by: STUDENT IN AN ORGANIZED HEALTH CARE EDUCATION/TRAINING PROGRAM

## 2020-09-10 RX ORDER — IBUPROFEN 600 MG/1
TABLET ORAL
Qty: 90 TABLET | Refills: 0 | Status: SHIPPED | OUTPATIENT
Start: 2020-09-10 | End: 2021-09-21

## 2020-09-10 ASSESSMENT — ENCOUNTER SYMPTOMS
PHOTOPHOBIA: 0
NAUSEA: 0
CONSTIPATION: 0
EYE PAIN: 0
SHORTNESS OF BREATH: 0
BACK PAIN: 0
WHEEZING: 0
EYE REDNESS: 0
ABDOMINAL PAIN: 0
CHEST TIGHTNESS: 0
SORE THROAT: 0
COUGH: 0
EYE DISCHARGE: 0
DIARRHEA: 0
BLOOD IN STOOL: 0

## 2020-09-10 ASSESSMENT — PATIENT HEALTH QUESTIONNAIRE - PHQ9
SUM OF ALL RESPONSES TO PHQ9 QUESTIONS 1 & 2: 0
SUM OF ALL RESPONSES TO PHQ QUESTIONS 1-9: 0
2. FEELING DOWN, DEPRESSED OR HOPELESS: 0
SUM OF ALL RESPONSES TO PHQ QUESTIONS 1-9: 0
1. LITTLE INTEREST OR PLEASURE IN DOING THINGS: 0

## 2020-09-10 NOTE — PROGRESS NOTES
Visit Information    Have you changed or started any medications since your last visit including any over-the-counter medicines, vitamins, or herbal medicines? no   Have you stopped taking any of your medications? Is so, why? -  no  Are you having any side effects from any of your medications? - no    Have you seen any other physician or provider since your last visit?  no   Have you had any other diagnostic tests since your last visit?  no   Have you been seen in the emergency room and/or had an admission in a hospital since we last saw you?  no   Have you had your routine dental cleaning in the past 6 months?  no     Do you have an active MyChart account? If no, what is the barrier?   No: inactive    Patient Care Team:  Milagro Terrazas MD as PCP - General (Family Medicine)  Jaquan Damon MD as Consulting Physician (Obstetrics & Gynecology)    Medical History Review  Past Medical, Family, and Social History reviewed and does not contribute to the patient presenting condition    Health Maintenance   Topic Date Due    HIV screen  01/23/1996    Flu vaccine (1) 09/01/2020    Varicella vaccine (1 of 2 - 2-dose childhood series) 10/24/2020 (Originally 1/23/1982)    Potassium monitoring  06/12/2021    Creatinine monitoring  06/12/2021    DTaP/Tdap/Td vaccine (2 - Td) 03/17/2025    Pneumococcal 0-64 years Vaccine  Completed    Hepatitis A vaccine  Aged Out    Hepatitis B vaccine  Aged Out    Hib vaccine  Aged Out    Meningococcal (ACWY) vaccine  Aged Out

## 2020-09-10 NOTE — PROGRESS NOTES
Subjective:    Clarence Roberts is a 45 y.o. female with  has a past medical history of Abdominal pain, Bilateral carpal tunnel syndrome, Fibroids, HTN (hypertension), Menometrorrhagia, and Obesity (BMI 30.0-34.9). Family History   Problem Relation Age of Onset    Diabetes Mother     Arthritis Neg Hx     Asthma Neg Hx     Birth Defects Neg Hx     Cancer Neg Hx     Depression Neg Hx     Early Death Neg Hx     Hearing Loss Neg Hx     Heart Disease Neg Hx     High Blood Pressure Neg Hx     High Cholesterol Neg Hx     Kidney Disease Neg Hx     Learning Disabilities Neg Hx     Mental Illness Neg Hx     Mental Retardation Neg Hx     Miscarriages / Stillbirths Neg Hx     Stroke Neg Hx     Vision Loss Neg Hx     Substance Abuse Neg Hx     Other Neg Hx        Presented tothe office today for:  Chief Complaint   Patient presents with    Wrist Pain     patient complains of carpal tunnel issues. Was using wrist splint for treatment and tylenol. Thinks she needs a different splint     Hypertension     wants to discuss changing bp medication, complains of fatigue, hair loss and constant bowel movements.  Forms     fmla extension, will be  in december       HPI  Patient is a 46 yo female here for htn follow up. Her medications include lisinopril hctz combo 20-12.5    Noticed Hair thinning and going to bathroom frequently for past year  Review of Systems   Constitutional: Negative for chills, diaphoresis, fatigue and fever. HENT: Negative for ear pain and sore throat. Eyes: Negative for photophobia, pain, discharge, redness and visual disturbance. Respiratory: Negative for cough, chest tightness, shortness of breath and wheezing. Cardiovascular: Negative for chest pain, palpitations and leg swelling. Gastrointestinal: Negative for abdominal pain, blood in stool, constipation, diarrhea and nausea. Endocrine: Negative for cold intolerance and heat intolerance.    Genitourinary: Negative for difficulty urinating and hematuria. Musculoskeletal: Negative for back pain and joint swelling. Neurological: Negative for light-headedness and headaches. Psychiatric/Behavioral: Negative for agitation and confusion. Objective:    BP (!) 134/95   Pulse 81   Temp 97.2 °F (36.2 °C)   Wt 199 lb (90.3 kg)   LMP 05/17/2015 Comment: blood hcg negative today  SpO2 99%   BMI 35.25 kg/m²    BP Readings from Last 3 Encounters:   09/10/20 (!) 134/95   03/18/20 117/79   10/24/19 (!) 147/101       Physical Exam  Eyes:      General: No scleral icterus. Conjunctiva/sclera: Conjunctivae normal.   Neck:      Musculoskeletal: Normal range of motion and neck supple. Thyroid: No thyromegaly. Cardiovascular:      Rate and Rhythm: Normal rate and regular rhythm. Heart sounds: Normal heart sounds. Pulmonary:      Effort: Pulmonary effort is normal.      Breath sounds: Normal breath sounds. No wheezing or rales. Chest:      Chest wall: No tenderness. Musculoskeletal:         General: No tenderness. Skin:     Coloration: Skin is not pale. Findings: No erythema. Lab Results   Component Value Date    WBC 10.9 11/20/2015    HGB 13.1 11/20/2015    HCT 40.1 11/20/2015     11/20/2015    ALT 18 02/06/2015    AST 15 02/06/2015     06/12/2020    K 3.8 06/12/2020     06/12/2020    CREATININE 0.63 06/12/2020    BUN 13 06/12/2020    CO2 23 06/12/2020    TSH 1.17 08/12/2014    INR 0.9 08/12/2014    LABA1C 5.3 08/12/2014    LABMICR 14 11/20/2015     Lab Results   Component Value Date    CALCIUM 9.4 06/12/2020     No results found for: LDLCALC, LDLCHOLESTEROL, LDLDIRECT    Assessment and Plan:    1. Essential hypertension  - continue current regimen    2. Fatigue, unspecified type    - TSH With Reflex Ft4; Future  - CBC With Auto Differential; Future    3.  Bilateral carpal tunnel syndrome    - DME Order for [facility]) as OP          Requested Prescriptions     Signed

## 2020-09-10 NOTE — PATIENT INSTRUCTIONS
Patient Education         Headaches: Keeping a Diary (01:31)  Your health professional recommends that you watch this short online health video. Learn how keeping a headache diary can help you find what's causing your pain. How to watch the video    Scan the QR code   OR Visit the website    https://hwi. se/r/Gggksxjpnzsy5   Current as of: November 20, 2019               Content Version: 12.5  © 2006-2020 [employer] Care instructions adapted under license by [facility]. If you have questions about a medical condition or this instruction, always ask your healthcare professional. [place] [address] any warranty or liability for your use of this information. Patient Education        Headache: Care Instructions  Your Care Instructions     Headaches have many possible causes. Most headaches aren't a sign of a more serious problem, and they will get better on their own. Home treatment may help you feel better faster. The doctor has checked you carefully, but problems can develop later. If you notice any problems or new symptoms, get medical treatment right away. Follow-up care is a key part of your treatment and safety. Be sure to make and go to all appointments, and call your doctor if you are having problems. It's also a good idea to know your test results and keep a list of the medicines you take. How can you care for yourself at home? · Do not drive if you have taken a prescription pain medicine. · Rest in a quiet, dark room until your headache is gone. Close your eyes and try to relax or go to sleep. Don't watch TV or read. · Put a cold, moist cloth or cold pack on the painful area for 10 to 20 minutes at a time. Put a thin cloth between the cold pack and your skin. · Use a warm, moist towel or a heating pad set on low to relax tight shoulder and neck muscles. · Have someone gently massage your neck and shoulders. · Take pain medicines exactly as directed. ?  If the doctor gave you a prescription medicine for pain, take it as prescribed. ? If you are not taking a prescription pain medicine, ask your doctor if you can take an over-the-counter medicine. · Be careful not to take pain medicine more often than the instructions allow, because you may get worse or more frequent headaches when the medicine wears off. · Do not ignore new symptoms that occur with a headache, such as a fever, weakness or numbness, vision changes, or confusion. These may be signs of a more serious problem. To prevent headaches  · Keep a headache diary so you can figure out what triggers your headaches. Avoiding triggers may help you prevent headaches. Record when each headache began, how long it lasted, and what the pain was like (throbbing, aching, stabbing, or dull). Write down any other symptoms you had with the headache, such as nausea, flashing lights or dark spots, or sensitivity to bright light or loud noise. Note if the headache occurred near your period. List anything that might have triggered the headache, such as certain foods (chocolate, cheese, wine) or odors, smoke, bright light, stress, or lack of sleep. · Find healthy ways to deal with stress. Headaches are most common during or right after stressful times. Take time to relax before and after you do something that has caused a headache in the past.  · Try to keep your muscles relaxed by keeping good posture. Check your jaw, face, neck, and shoulder muscles for tension, and try relaxing them. When sitting at a desk, change positions often, and stretch for 30 seconds each hour. · Get plenty of sleep and exercise. · Eat regularly and well. Long periods without food can trigger a headache. · Treat yourself to a massage. Some people find that regular massages are very helpful in relieving tension. · Limit caffeine by not drinking too much coffee, tea, or soda.  But don't quit caffeine suddenly, because that can also give you headaches. · Reduce eyestrain from computers by blinking frequently and looking away from the computer screen every so often. Make sure you have proper eyewear and that your monitor is set up properly, about an arm's length away. · Seek help if you have depression or anxiety. Your headaches may be linked to these conditions. Treatment can both prevent headaches and help with symptoms of anxiety or depression. When should you call for help? [id number] anytime you think you may need emergency care. For example, call if:  · You have signs of a stroke. These may include:  ? Sudden numbness, paralysis, or weakness in your face, arm, or leg, especially on only one side of your body. ? Sudden vision changes. ? Sudden trouble speaking. ? Sudden confusion or trouble understanding simple statements. ? Sudden problems with walking or balance. ? A sudden, severe headache that is different from past headaches. Call your doctor now or seek immediate medical care if:  · You have a new or worse headache. · Your headache gets much worse. Where can you learn more? Go to https://"[employer]. org and sign in to your everbill account. Enter 1424 [id number] in the [employer] box to learn more about \"Headache: Care Instructions. \"     If you do not have an account, please click on the \"Sign Up Now\" link. Current as of: November 20, 2019               Content Version: 12.5  © 5540-7000 [employer]wise, Incorporated. Care instructions adapted under license by [facility] ([facility]). If you have questions about a medical condition or this instruction, always ask your healthcare professional. [place] [address] any warranty or liability for your use of this information.

## 2020-09-10 NOTE — PROGRESS NOTES
Attending Physician Statement  I  have discussed the care of Julio Richard including pertinent history and exam findings with the resident. I agree with the assessment, plan and orders as documented by the resident. BP (!) 134/95   Pulse 81   Temp 97.2 °F (36.2 °C)   Wt 199 lb (90.3 kg)   LMP 05/17/2015 Comment: blood hcg negative today  SpO2 99%   BMI 35.25 kg/m²    BP Readings from Last 3 Encounters:   09/10/20 (!) 134/95   03/18/20 117/79   10/24/19 (!) 147/101     Wt Readings from Last 3 Encounters:   09/10/20 199 lb (90.3 kg)   03/18/20 203 lb (92.1 kg)   10/24/19 197 lb (89.4 kg)          Diagnosis Orders   1. Essential hypertension     2. Fatigue, unspecified type  TSH With Reflex Ft4    CBC With Auto Differential   3. Bilateral carpal tunnel syndrome  DME Order for [facility]) as OP   4.  Left knee pain, unspecified chronicity  ibuprofen (ADVIL;MOTRIN) 600 MG tablet       Jake Garcia DO 9/10/2020 11:37 AM

## 2020-10-09 RX ORDER — LISINOPRIL AND HYDROCHLOROTHIAZIDE 20; 12.5 MG/1; MG/1
TABLET ORAL
Qty: 30 TABLET | Refills: 0 | Status: SHIPPED | OUTPATIENT
Start: 2020-10-09 | End: 2020-12-07 | Stop reason: SDUPTHER

## 2020-10-09 NOTE — TELEPHONE ENCOUNTER
Last visit: 09/10/20  Last Med refill:   Does patient have enough medication for 72 hours:     Next Visit Date:  Future Appointments   Date Time Provider Fredi Song   10/9/2020 10:45 AM Nkechi Mack MD [address] [place] [address] Maintenance   Topic Date Due    HIV screen  01/23/1996    Flu vaccine (1) 09/01/2020    Varicella vaccine (1 of 2 - 2-dose childhood series) 10/24/2020 (Originally 1/23/1982)    Potassium monitoring  06/12/2021    Creatinine monitoring  06/12/2021    DTaP/Tdap/Td vaccine (2 - Td) 03/17/2025    Pneumococcal 0-64 years Vaccine  Completed    Hepatitis A vaccine  Aged Out    Hepatitis B vaccine  Aged Out    Hib vaccine  Aged Out    Meningococcal (ACWY) vaccine  Aged Out       Hemoglobin A1C (%)   Date Value   08/12/2014 5.3             ( goal A1C is < 7)   Microalb/Crt. Ratio (mcg/mg creat)   Date Value   11/20/2015 14     No results found for: LDLCHOLESTEROL, LDLCALC    (goal LDL is <100)   AST (U/L)   Date Value   02/06/2015 15     ALT (U/L)   Date Value   02/06/2015 18     BUN (mg/dL)   Date Value   06/12/2020 13     BP Readings from Last 3 Encounters:   09/10/20 (!) 134/95   03/18/20 117/79   10/24/19 (!) 147/101          (goal 120/80)    All Future Testing planned in [facility]  Lab Frequency Next Occurrence   HIV Screen Once 10/10/2020   TSH With Reflex Ft4 Once 09/10/2021   CBC With Auto Differential Once 09/10/2021               Patient Active Problem List:     Fibroids     Dysmenorrhea     Menometrorrhagia     HTN (hypertension)     Tobacco use     Obesity (BMI 30.0-34. 9)     Enlarged uterus     Uterine Polyps     Abnormal uterine bleeding (AUB)     Hypokalemia     Endometriosis, stage 4     Thyromegaly     Bilateral carpal tunnel syndrome

## 2020-12-07 ENCOUNTER — OFFICE VISIT (OUTPATIENT)
Dept: FAMILY MEDICINE CLINIC | Age: 40
End: 2020-12-07
Payer: COMMERCIAL

## 2020-12-07 VITALS
HEART RATE: 92 BPM | TEMPERATURE: 97 F | HEIGHT: 63 IN | WEIGHT: 198.8 LBS | BODY MASS INDEX: 35.22 KG/M2 | SYSTOLIC BLOOD PRESSURE: 134 MMHG | DIASTOLIC BLOOD PRESSURE: 95 MMHG

## 2020-12-07 PROCEDURE — 99213 OFFICE O/P EST LOW 20 MIN: CPT | Performed by: STUDENT IN AN ORGANIZED HEALTH CARE EDUCATION/TRAINING PROGRAM

## 2020-12-07 RX ORDER — GABAPENTIN 300 MG/1
300 CAPSULE ORAL NIGHTLY
Qty: 30 CAPSULE | Refills: 0 | Status: SHIPPED | OUTPATIENT
Start: 2020-12-07 | End: 2021-05-24 | Stop reason: SDUPTHER

## 2020-12-07 RX ORDER — LISINOPRIL AND HYDROCHLOROTHIAZIDE 20; 12.5 MG/1; MG/1
TABLET ORAL
Qty: 30 TABLET | Refills: 5 | Status: SHIPPED | OUTPATIENT
Start: 2020-12-07 | End: 2020-12-21 | Stop reason: SDUPTHER

## 2020-12-07 ASSESSMENT — ENCOUNTER SYMPTOMS
DIARRHEA: 0
SHORTNESS OF BREATH: 0
COUGH: 0
NAUSEA: 0
CHEST TIGHTNESS: 0
VOMITING: 0
CONSTIPATION: 0
SORE THROAT: 0
ABDOMINAL PAIN: 0

## 2020-12-07 ASSESSMENT — PATIENT HEALTH QUESTIONNAIRE - PHQ9
SUM OF ALL RESPONSES TO PHQ QUESTIONS 1-9: 0
1. LITTLE INTEREST OR PLEASURE IN DOING THINGS: 0
2. FEELING DOWN, DEPRESSED OR HOPELESS: 0
SUM OF ALL RESPONSES TO PHQ QUESTIONS 1-9: 0
SUM OF ALL RESPONSES TO PHQ QUESTIONS 1-9: 0
SUM OF ALL RESPONSES TO PHQ9 QUESTIONS 1 & 2: 0

## 2020-12-07 NOTE — PROGRESS NOTES
Subjective:    Haven Junior is a 45 y.o. female with  has a past medical history of Abdominal pain, Bilateral carpal tunnel syndrome, Fibroids, HTN (hypertension), Menometrorrhagia, and Obesity (BMI 30.0-34.9). Family History   Problem Relation Age of Onset    Diabetes Mother     Arthritis Neg Hx     Asthma Neg Hx     Birth Defects Neg Hx     Cancer Neg Hx     Depression Neg Hx     Early Death Neg Hx     Hearing Loss Neg Hx     Heart Disease Neg Hx     High Blood Pressure Neg Hx     High Cholesterol Neg Hx     Kidney Disease Neg Hx     Learning Disabilities Neg Hx     Mental Illness Neg Hx     Mental Retardation Neg Hx     Miscarriages / Stillbirths Neg Hx     Stroke Neg Hx     Vision Loss Neg Hx     Substance Abuse Neg Hx     Other Neg Hx        Presented tothe office today for:  Chief Complaint   Patient presents with    Follow-up     here for FMLA forms       HPI    Chief Complaint: PEPE CARPAL TUNNEL  When did it happen? 2 YRS  Mechanism? BUILD BATTERIES  Location: WRISTS  Intensity: 5/10  Quality: SHARP, SHOOTING  Radiation:UP THE ARMS  Getting better, worse or staying the same? WORSE  Aggravating: TWISTING OF HANDS  Associated: SPASPMS  Treatments/Alleviating/Medications: WRIST BRACES AT NIGHT    Review of Systems   Constitutional: Negative for chills, fatigue, fever and unexpected weight change. HENT: Negative for congestion, mouth sores and sore throat. Eyes: Negative for visual disturbance. Respiratory: Negative for cough, chest tightness and shortness of breath. Cardiovascular: Negative for chest pain and leg swelling. Gastrointestinal: Negative for abdominal pain, constipation, diarrhea, nausea and vomiting. Genitourinary: Negative for difficulty urinating. Musculoskeletal: Negative for joint swelling. PEPE; WRIST PAIN   Skin: Negative for rash. Neurological: Negative for dizziness, weakness and headaches.        Objective:    BP (!) 134/95 (Site: Right Upper Arm, Position: Sitting, Cuff Size: Medium Adult)   Pulse 92   Temp 97 °F (36.1 °C) (Temporal)   Ht 5' 3\" (1.6 m)   Wt 198 lb 12.8 oz (90.2 kg)   LMP 05/17/2015 Comment: blood hcg negative today  BMI 35.22 kg/m²    BP Readings from Last 3 Encounters:   12/07/20 (!) 134/95   09/10/20 (!) 134/95   03/18/20 117/79     Physical Exam  Vitals signs and nursing note reviewed. Constitutional:       Appearance: She is well-developed. Cardiovascular:      Rate and Rhythm: Normal rate and regular rhythm. Heart sounds: Normal heart sounds. No murmur. No friction rub. No gallop. Pulmonary:      Effort: Pulmonary effort is normal. No respiratory distress. Breath sounds: Normal breath sounds. No wheezing or rales. Chest:      Chest wall: No tenderness. Abdominal:      General: Bowel sounds are normal. There is no distension. Palpations: Abdomen is soft. There is no mass. Tenderness: There is no abdominal tenderness. There is no guarding. Musculoskeletal:      Comments: POSITIVE PHALEN AND TINNEL SIGN   Skin:     Capillary Refill: Capillary refill takes less than 2 seconds. Neurological:      Mental Status: She is alert and oriented to person, place, and time. Lab Results   Component Value Date    WBC 10.9 11/20/2015    HGB 13.1 11/20/2015    HCT 40.1 11/20/2015     11/20/2015    ALT 18 02/06/2015    AST 15 02/06/2015     06/12/2020    K 3.8 06/12/2020     06/12/2020    CREATININE 0.63 06/12/2020    BUN 13 06/12/2020    CO2 23 06/12/2020    TSH 1.17 08/12/2014    INR 0.9 08/12/2014    LABA1C 5.3 08/12/2014    LABMICR 14 11/20/2015     Lab Results   Component Value Date    CALCIUM 9.4 06/12/2020     No results found for: LDLCALC, LDLCHOLESTEROL, LDLDIRECT    Assessment and Plan:    1. Bilateral carpal tunnel syndrome  - gabapentin (NEURONTIN) 300 MG capsule; Take 1 capsule by mouth nightly for 30 days. Intended supply: 30 days  Dispense: 30 capsule;  Refill: 0  -Continue with wrist braces at night    2. Essential hypertension  - lisinopril-hydroCHLOROthiazide (PRINZIDE;ZESTORETIC) 20-12.5 MG per tablet; TAKE 1 TABLET BY MOUTH ONE TIME A DAY  Dispense: 30 tablet; Refill: 5          Requested Prescriptions     Signed Prescriptions Disp Refills    lisinopril-hydroCHLOROthiazide (PRINZIDE;ZESTORETIC) 20-12.5 MG per tablet 30 tablet 5     Sig: TAKE 1 TABLET BY MOUTH ONE TIME A DAY    gabapentin (NEURONTIN) 300 MG capsule 30 capsule 0     Sig: Take 1 capsule by mouth nightly for 30 days. Intended supply: 30 days       Medications Discontinued During This Encounter   Medication Reason    lisinopril-hydroCHLOROthiazide (PRINZIDE;ZESTORETIC) 20-12.5 MG per tablet REORDER       Return in about 2 weeks (around 12/21/2020) for PEPE CARPEL TUNNEL. Henok Goddard received counseling on the following healthy behaviors: nutrition and exercise  Reviewed prior labs and health maintenance  Continue current medications, diet and exercise. Discussed use, benefit, and side effects of prescribed medications. Barriers to medication compliance addressed. Patient given educational materials - see patient instructions  Was a self-tracking handout given in paper form or via datangot? Yes    Requested Prescriptions     Signed Prescriptions Disp Refills    lisinopril-hydroCHLOROthiazide (PRINZIDE;ZESTORETIC) 20-12.5 MG per tablet 30 tablet 5     Sig: TAKE 1 TABLET BY MOUTH ONE TIME A DAY    gabapentin (NEURONTIN) 300 MG capsule 30 capsule 0     Sig: Take 1 capsule by mouth nightly for 30 days. Intended supply: 30 days       All patient questions answered. Patient voiced understanding. Quality Measures    Body mass index is 35.22 kg/m². Elevated. Weight control planned discussed Healthy diet and regular exercise. BP: (!) 134/95 Blood pressure is high. Treatment plan consists of Weight Reduction, DASH Eating Plan, Dietary Sodium Restriction and Increased Physical Activity.     No results found for: LDLCALC, LDLCHOLESTEROL, LDLDIRECT (goal LDL reduction with dx if diabetes is 50% LDL reduction)      PHQ Scores 12/7/2020 9/10/2020 3/18/2020 10/24/2019 11/7/2018 10/3/2017   PHQ2 Score 0 0 0 0 0 0   PHQ9 Score 0 0 0 0 0 0     Interpretation of Total Score Depression Severity: 1-4 = Minimal depression, 5-9 = Mild depression, 10-14 = Moderate depression, 15-19 = Moderately severe depression, 20-27 = Severe depression

## 2020-12-21 ENCOUNTER — OFFICE VISIT (OUTPATIENT)
Dept: FAMILY MEDICINE CLINIC | Age: 40
End: 2020-12-21
Payer: COMMERCIAL

## 2020-12-21 VITALS
WEIGHT: 198 LBS | TEMPERATURE: 97.7 F | HEIGHT: 63 IN | HEART RATE: 98 BPM | DIASTOLIC BLOOD PRESSURE: 82 MMHG | BODY MASS INDEX: 35.08 KG/M2 | SYSTOLIC BLOOD PRESSURE: 137 MMHG

## 2020-12-21 PROCEDURE — 99213 OFFICE O/P EST LOW 20 MIN: CPT | Performed by: STUDENT IN AN ORGANIZED HEALTH CARE EDUCATION/TRAINING PROGRAM

## 2020-12-21 RX ORDER — LISINOPRIL AND HYDROCHLOROTHIAZIDE 20; 12.5 MG/1; MG/1
TABLET ORAL
Qty: 30 TABLET | Refills: 5 | Status: SHIPPED | OUTPATIENT
Start: 2020-12-21 | End: 2022-02-23 | Stop reason: SDUPTHER

## 2020-12-21 ASSESSMENT — ENCOUNTER SYMPTOMS
CHEST TIGHTNESS: 0
SORE THROAT: 0
CONSTIPATION: 0
DIARRHEA: 0
ABDOMINAL PAIN: 0
NAUSEA: 0
VOMITING: 0
SHORTNESS OF BREATH: 0
COUGH: 0

## 2020-12-21 NOTE — PROGRESS NOTES
Visit Information    Have you changed or started any medications since your last visit including any over-the-counter medicines, vitamins, or herbal medicines? no   Have you stopped taking any of your medications? Is so, why? -  no  Are you having any side effects from any of your medications? - no    Have you seen any other physician or provider since your last visit?  no   Have you had any other diagnostic tests since your last visit?  no   Have you been seen in the emergency room and/or had an admission in a hospital since we last saw you?  no   Have you had your routine dental cleaning in the past 6 months?  no     Do you have an active MyChart account? If no, what is the barrier?   Yes    Patient Care Team:  Kasi Vidal MD as PCP - General (Family Medicine)  Jose Angel Herrera DO as PCP - [facility]  [facility] Provider  Myrtle Landau, MD as Consulting Physician (Obstetrics & Gynecology)    Medical History Review  Past Medical, Family, and Social History reviewed and does not contribute to the patient presenting condition    Health Maintenance   Topic Date Due    Varicella vaccine (1 of 2 - 2-dose childhood series) 01/23/1982    HIV screen  01/23/1996    Flu vaccine (1) 09/01/2020    Potassium monitoring  06/12/2021    Creatinine monitoring  06/12/2021    DTaP/Tdap/Td vaccine (2 - Td) 03/17/2025    Pneumococcal 0-64 years Vaccine  Completed    Hepatitis A vaccine  Aged Out    Hepatitis B vaccine  Aged Out    Hib vaccine  Aged Out    Meningococcal (ACWY) vaccine  Aged Out

## 2020-12-21 NOTE — PROGRESS NOTES
I have reviewed and discussed key elements of Harvey Springer with the resident including plan of care and follow up and agree with the care ty plan.

## 2020-12-21 NOTE — PROGRESS NOTES
Subjective:    Miguel Ramos is a 45 y.o. female with  has a past medical history of Abdominal pain, Bilateral carpal tunnel syndrome, Fibroids, HTN (hypertension), Menometrorrhagia, and Obesity (BMI 30.0-34.9). Family History   Problem Relation Age of Onset    Diabetes Mother     Arthritis Neg Hx     Asthma Neg Hx     Birth Defects Neg Hx     Cancer Neg Hx     Depression Neg Hx     Early Death Neg Hx     Hearing Loss Neg Hx     Heart Disease Neg Hx     High Blood Pressure Neg Hx     High Cholesterol Neg Hx     Kidney Disease Neg Hx     Learning Disabilities Neg Hx     Mental Illness Neg Hx     Mental Retardation Neg Hx     Miscarriages / Stillbirths Neg Hx     Stroke Neg Hx     Vision Loss Neg Hx     Substance Abuse Neg Hx     Other Neg Hx        Presented tothe office today for:  Chief Complaint   Patient presents with    Carpal Tunnel     2 week follow up   [facility] Maintenance     vaccines declined       HPI    Chief Complaint: DAVIDE CARPAL TUNNEL  When did it happen? 2 YRS  Mechanism? BUILD BATTERIES  Location: WRISTS  Intensity: 5/10  Quality: SHARP, SHOOTING  Radiation:UP THE ARMS  Getting better, worse or staying the same? WORSE  Aggravating: TWISTING OF HANDS  Associated: SPASPMS  Treatments/Alleviating/Medications: WRIST BRACES AT NIGHT    Review of Systems   Constitutional: Negative for chills, fatigue, fever and unexpected weight change. HENT: Negative for congestion, mouth sores and sore throat. Eyes: Negative for visual disturbance. Respiratory: Negative for cough, chest tightness and shortness of breath. Cardiovascular: Negative for chest pain and leg swelling. Gastrointestinal: Negative for abdominal pain, constipation, diarrhea, nausea and vomiting. Genitourinary: Negative for difficulty urinating. Musculoskeletal: Negative for joint swelling. Davide wrist pain   Skin: Negative for rash. Neurological: Negative for dizziness, weakness and headaches. Objective:    /82 (Site: Left Upper Arm, Position: Sitting, Cuff Size: Large Adult)   Pulse 98   Temp 97.7 °F (36.5 °C) (Temporal)   Ht 5' 3\" (1.6 m)   Wt 198 lb (89.8 kg)   LMP 05/17/2015 Comment: blood hcg negative today  BMI 35.07 kg/m²    BP Readings from Last 3 Encounters:   12/21/20 137/82   12/07/20 (!) 134/95   09/10/20 (!) 134/95     Physical Exam  Vitals signs and nursing note reviewed. Constitutional:       Appearance: She is well-developed. Cardiovascular:      Rate and Rhythm: Normal rate and regular rhythm. Heart sounds: Normal heart sounds. No murmur. No friction rub. No gallop. Pulmonary:      Effort: Pulmonary effort is normal. No respiratory distress. Breath sounds: Normal breath sounds. No wheezing or rales. Chest:      Chest wall: No tenderness. Abdominal:      General: Bowel sounds are normal. There is no distension. Palpations: Abdomen is soft. There is no mass. Tenderness: There is no abdominal tenderness. There is no guarding. Musculoskeletal:      Comments: positive jose phalen and tinnel sign   Skin:     Capillary Refill: Capillary refill takes less than 2 seconds. Neurological:      Mental Status: She is alert and oriented to person, place, and time. Lab Results   Component Value Date    WBC 10.9 11/20/2015    HGB 13.1 11/20/2015    HCT 40.1 11/20/2015     11/20/2015    ALT 18 02/06/2015    AST 15 02/06/2015     06/12/2020    K 3.8 06/12/2020     06/12/2020    CREATININE 0.63 06/12/2020    BUN 13 06/12/2020    CO2 23 06/12/2020    TSH 1.17 08/12/2014    INR 0.9 08/12/2014    LABA1C 5.3 08/12/2014    LABMICR 14 11/20/2015     Lab Results   Component Value Date    CALCIUM 9.4 06/12/2020     No results found for: LDLCALC, LDLCHOLESTEROL, LDLDIRECT    Assessment and Plan:    1.  Bilateral carpal tunnel syndrome  - continue with gabapentin as needed  - cont with braces at night  - will switch to the different department at [facility]    2. Essential hypertension  - lisinopril-hydroCHLOROthiazide (PRINZIDE;ZESTORETIC) 20-12.5 MG per tablet; TAKE 1 TABLET BY MOUTH ONE TIME A DAY  Dispense: 30 tablet; Refill: 5          Requested Prescriptions     Signed Prescriptions Disp Refills    lisinopril-hydroCHLOROthiazide (PRINZIDE;ZESTORETIC) 20-12.5 MG per tablet 30 tablet 5     Sig: TAKE 1 TABLET BY MOUTH ONE TIME A DAY       Medications Discontinued During This Encounter   Medication Reason    lisinopril-hydroCHLOROthiazide (PRINZIDE;ZESTORETIC) 20-12.5 MG per tablet REORDER       Return in about 3 months (around 3/21/2021) for jose CT syn. Clydene Osler received counseling on the following healthy behaviors: nutrition and exercise  Reviewed prior labs and health maintenance  Continue current medications, diet and exercise. Discussed use, benefit, and side effects of prescribed medications. Barriers to medication compliance addressed. Patient given educational materials - see patient instructions  Was a self-tracking handout given in paper form or via [employer]? Yes    Requested Prescriptions     Signed Prescriptions Disp Refills    lisinopril-hydroCHLOROthiazide (PRINZIDE;ZESTORETIC) 20-12.5 MG per tablet 30 tablet 5     Sig: TAKE 1 TABLET BY MOUTH ONE TIME A DAY       All patient questions answered. Patient voiced understanding. Quality Measures    Body mass index is 35.07 kg/m². Elevated. Weight control planned discussed Healthy diet and regular exercise. BP: 137/82 Blood pressure is normal. Treatment plan consists of Weight Reduction, DASH Eating Plan, Dietary Sodium Restriction and Increased Physical Activity.     No results found for: LDLCALC, LDLCHOLESTEROL, LDLDIRECT (goal LDL reduction with dx if diabetes is 50% LDL reduction)      PHQ Scores 12/7/2020 9/10/2020 3/18/2020 10/24/2019 11/7/2018 10/3/2017   PHQ2 Score 0 0 0 0 0 0   PHQ9 Score 0 0 0 0 0 0     Interpretation of Total Score Depression Severity: 1-4 = Minimal depression, 5-9 = Mild depression, 10-14 = Moderate depression, 15-19 = Moderately severe depression, 20-27 = Severe depression

## 2020-12-21 NOTE — PATIENT INSTRUCTIONS
Thank you for letting us take care of you today. We hope all your questions were addressed. If a question was overlooked or something else comes to mind after you return home, please contact a member of your Care Team listed below. Your Care Team at [place] [address] is Team #4  Luci Demarco MD (Faculty)  Michelle Murphy MD (Resident)  Niru Merchant MD (Resident)  Angelica Sherwood MD (Resident)  Fernando Peters MD (Resident)  STACY Subramanian RMA Tyronne Johann., [facility] office)  Viji Zayas, [address] (Clinical Practice Manager)  Hiram Kuhn, [address] [place] [address] (Clinical Pharmacist)       Office phone number: [phone]    If you need to get in right away due to illness, please be advised we have \"Same Day\" appointments available Monday-Friday. Please call us at [phone] option #3 to schedule your \"Same Day\" appointment.

## 2021-01-05 ENCOUNTER — TELEPHONE (OUTPATIENT)
Dept: FAMILY MEDICINE CLINIC | Age: 41
End: 2021-01-05

## 2021-01-05 NOTE — TELEPHONE ENCOUNTER
Patient called wanted to know if the FLMA was updated, section 1B and 4B need to have dates, please advise. In your mailbox. Thank you.

## 2021-01-07 NOTE — TELEPHONE ENCOUNTER
Call patient about FMLA paperwork no answer and left message to call the office back about 1B, provider would like to know what date she wants to put on the paperwork.  1/7/2021 @ 2:40pm

## 2021-01-07 NOTE — TELEPHONE ENCOUNTER
Patient called in requesting that her PCP initials any corrections made on her FMLA forms. Please advise thank you!

## 2021-01-08 ENCOUNTER — TELEPHONE (OUTPATIENT)
Dept: FAMILY MEDICINE CLINIC | Age: 41
End: 2021-01-08

## 2021-01-13 NOTE — TELEPHONE ENCOUNTER
Pt calling states the parts on the FMLA papers that need to be completed  Are sections 1 B and 4 B pt needs ASAP.

## 2021-01-20 NOTE — TELEPHONE ENCOUNTER
Received voicemail message from patient stating that her FMLA are still incomplete. States the provider area and the fax number need to be added. All yellow highlighted areas also need to be initialed. Paper work is in your in box. Please review and advise.

## 2021-05-24 ENCOUNTER — OFFICE VISIT (OUTPATIENT)
Dept: FAMILY MEDICINE CLINIC | Age: 41
End: 2021-05-24
Payer: COMMERCIAL

## 2021-05-24 VITALS
BODY MASS INDEX: 34.61 KG/M2 | HEART RATE: 81 BPM | DIASTOLIC BLOOD PRESSURE: 100 MMHG | SYSTOLIC BLOOD PRESSURE: 137 MMHG | WEIGHT: 195.4 LBS

## 2021-05-24 DIAGNOSIS — I10 ESSENTIAL HYPERTENSION: ICD-10-CM

## 2021-05-24 DIAGNOSIS — G56.03 BILATERAL CARPAL TUNNEL SYNDROME: Primary | ICD-10-CM

## 2021-05-24 PROCEDURE — 99213 OFFICE O/P EST LOW 20 MIN: CPT | Performed by: STUDENT IN AN ORGANIZED HEALTH CARE EDUCATION/TRAINING PROGRAM

## 2021-05-24 RX ORDER — PHENYLEPHRINE HYDROCHLORIDE 10 MG/1
2 TABLET, COATED ORAL DAILY
Qty: 2 EACH | Refills: 0 | Status: SHIPPED | OUTPATIENT
Start: 2021-05-24 | End: 2021-09-21 | Stop reason: SDUPTHER

## 2021-05-24 RX ORDER — IBUPROFEN 600 MG/1
600 TABLET ORAL 4 TIMES DAILY PRN
Qty: 120 TABLET | Refills: 5 | Status: SHIPPED | OUTPATIENT
Start: 2021-05-24 | End: 2022-07-11

## 2021-05-24 RX ORDER — GABAPENTIN 300 MG/1
300 CAPSULE ORAL NIGHTLY
Qty: 30 CAPSULE | Refills: 0 | Status: SHIPPED | OUTPATIENT
Start: 2021-05-24 | End: 2022-07-11

## 2021-05-24 ASSESSMENT — ENCOUNTER SYMPTOMS
DIARRHEA: 0
SHORTNESS OF BREATH: 0
CONSTIPATION: 0
VOMITING: 0
COUGH: 0
CHEST TIGHTNESS: 0
NAUSEA: 0
SORE THROAT: 0
ABDOMINAL PAIN: 0

## 2021-05-24 ASSESSMENT — PATIENT HEALTH QUESTIONNAIRE - PHQ9
SUM OF ALL RESPONSES TO PHQ QUESTIONS 1-9: 0
SUM OF ALL RESPONSES TO PHQ9 QUESTIONS 1 & 2: 0
SUM OF ALL RESPONSES TO PHQ QUESTIONS 1-9: 0
1. LITTLE INTEREST OR PLEASURE IN DOING THINGS: 0
2. FEELING DOWN, DEPRESSED OR HOPELESS: 0

## 2021-05-24 NOTE — PROGRESS NOTES
recheck. Appointment made for carpal tunnel steroid injection with Dr. Harini Tracy. Return in about 4 weeks (around 6/21/2021) for schedule with DR. Kari Brizuela for Carpal tunnel injections after nerve conduction study performed.    ([facility] ) Dr. Francesca Bryant MD

## 2021-05-24 NOTE — LETTER
[address]  [address]  [address] [place], [place] [address]  Phone: [phone]  Fax: [phone]    Libby Villa MD        May 24, 2021      To whom it may concern,         Ms. Renée Pratt is being treated for a medical condition that will only resolve if patient transfers to a department that does not require repetitive hand activity along with lifting equipment.     Thank you if any concerns please call [phone]      Sincerely,        Libby Villa MD

## 2021-05-24 NOTE — PROGRESS NOTES
Visit Information    Have you changed or started any medications since your last visit including any over-the-counter medicines, vitamins, or herbal medicines? no   Are you having any side effects from any of your medications? -  no  Have you stopped taking any of your medications? Is so, why? -  no    Have you seen any other physician or provider since your last visit? No  Have you had any other diagnostic tests since your last visit? No  Have you been seen in the emergency room and/or had an admission to a hospital since we last saw you? No  Have you had your routine dental cleaning in the past 6 months? no    Have you activated your "[employer] account? If not, what are your barriers?  No:      Patient Care Team:  Hong Francisco MD as PCP - General (Family Medicine)  Forrest Avila DO as PCP - [facility]  Aiden Ruvalcaba MD as Consulting Physician (Obstetrics & Gynecology)    Medical History Review  Past Medical, Family, and Social History reviewed and does not contribute to the patient presenting condition    Health Maintenance   Topic Date Due    Hepatitis C screen  Never done    Varicella vaccine (1 of 2 - 2-dose childhood series) Never done    COVID-19 Vaccine (1) Never done    HIV screen  Never done    Lipid screen  Never done    Potassium monitoring  06/12/2021    Creatinine monitoring  06/12/2021    Flu vaccine (Season Ended) 09/01/2021    DTaP/Tdap/Td vaccine (2 - Td) 03/17/2025    Pneumococcal 0-64 years Vaccine (2 of 2) 01/23/2046    Hepatitis A vaccine  Aged Out    Hepatitis B vaccine  Aged Out    Hib vaccine  Aged Out    Meningococcal (ACWY) vaccine  Aged Out

## 2021-05-24 NOTE — PROGRESS NOTES
Subjective:    Renetta Kumari is a 37 y.o. female with  has a past medical history of Abdominal pain, Bilateral carpal tunnel syndrome, Fibroids, HTN (hypertension), Menometrorrhagia, and Obesity (BMI 30.0-34.9). Family History   Problem Relation Age of Onset    Diabetes Mother     Arthritis Neg Hx     Asthma Neg Hx     Birth Defects Neg Hx     Cancer Neg Hx     Depression Neg Hx     Early Death Neg Hx     Hearing Loss Neg Hx     Heart Disease Neg Hx     High Blood Pressure Neg Hx     High Cholesterol Neg Hx     Kidney Disease Neg Hx     Learning Disabilities Neg Hx     Mental Illness Neg Hx     Mental Retardation Neg Hx     Miscarriages / Stillbirths Neg Hx     Stroke Neg Hx     Vision Loss Neg Hx     Substance Abuse Neg Hx     Other Neg Hx        Presented tothe office today for:  Chief Complaint   Patient presents with    Check-Up     wrist        HPI    Chief Complaint: DAVIDE CARPAL TUNNEL  When did it happen? 2 years ago  Mechanism? works in [employer]  Location: davide wrists  Intensity: 5/10  Quality: numbness, tingling  Radiation: up to the elbows  Getting better, worse or staying the same? worse  Aggravating: activity  Associated: spasms  Treatments/Alleviating/Medications:   -Wears wrist bands at work and occasionally at night       Review of Systems   Constitutional: Negative for chills, fatigue, fever and unexpected weight change. HENT: Negative for congestion, mouth sores and sore throat. Eyes: Negative for visual disturbance. Respiratory: Negative for cough, chest tightness and shortness of breath. Cardiovascular: Negative for chest pain and leg swelling. Gastrointestinal: Negative for abdominal pain, constipation, diarrhea, nausea and vomiting. Genitourinary: Negative for difficulty urinating. Musculoskeletal: Negative for joint swelling. Davide wrist pain    Skin: Negative for rash. Neurological: Negative for dizziness, weakness and headaches. Objective:    BP (!) 137/100   Pulse 81   Wt 195 lb 6.4 oz (88.6 kg)   LMP 05/17/2015 Comment: blood hcg negative today  BMI 34.61 kg/m²    BP Readings from Last 3 Encounters:   05/24/21 (!) 137/100   12/21/20 137/82   12/07/20 (!) 134/95     Physical Exam  Vitals and nursing note reviewed. Constitutional:       Appearance: She is well-developed. Cardiovascular:      Rate and Rhythm: Normal rate and regular rhythm. Heart sounds: Normal heart sounds. No murmur heard. No friction rub. No gallop. Pulmonary:      Effort: Pulmonary effort is normal. No respiratory distress. Breath sounds: Normal breath sounds. No wheezing or rales. Chest:      Chest wall: No tenderness. Abdominal:      General: Bowel sounds are normal. There is no distension. Palpations: Abdomen is soft. There is no mass. Tenderness: There is no abdominal tenderness. There is no guarding. Musculoskeletal:      Comments: positive jose phalen and tinnel sign    Skin:     Capillary Refill: Capillary refill takes less than 2 seconds. Neurological:      Mental Status: She is alert and oriented to person, place, and time. Lab Results   Component Value Date    WBC 10.9 11/20/2015    HGB 13.1 11/20/2015    HCT 40.1 11/20/2015     11/20/2015    ALT 18 02/06/2015    AST 15 02/06/2015     06/12/2020    K 3.8 06/12/2020     06/12/2020    CREATININE 0.63 06/12/2020    BUN 13 06/12/2020    CO2 23 06/12/2020    TSH 1.17 08/12/2014    INR 0.9 08/12/2014    LABA1C 5.3 08/12/2014    LABMICR 14 11/20/2015     Lab Results   Component Value Date    CALCIUM 9.4 06/12/2020     No results found for: LDLCALC, LDLCHOLESTEROL, LDLDIRECT    Assessment and Plan:    1. Bilateral carpal tunnel syndrome  - Nerve conduction test; Future  - ibuprofen (ADVIL;MOTRIN) 600 MG tablet; Take 1 tablet by mouth 4 times daily as needed for Pain  Dispense: 120 tablet; Refill: 5  - gabapentin (NEURONTIN) 300 MG capsule;  Take 1 capsule by mouth nightly for 30 days. Intended supply: 30 days  Dispense: 30 capsule; Refill: 0  -schedule with Dr. Felicitas Mcdonough for Carpal tunnel injections after nerve conduction study performed    2. Essential hypertension  -Pt skipped a couple of days of medication  -Has plenty of medication at home  -NO refills needed at this time. Requested Prescriptions     Signed Prescriptions Disp Refills    ibuprofen (ADVIL;MOTRIN) 600 MG tablet 120 tablet 5     Sig: Take 1 tablet by mouth 4 times daily as needed for Pain    gabapentin (NEURONTIN) 300 MG capsule 30 capsule 0     Sig: Take 1 capsule by mouth nightly for 30 days. Intended supply: 30 days       Medications Discontinued During This Encounter   Medication Reason    gabapentin (NEURONTIN) 300 MG capsule REORDER       Return in about 4 weeks (around 6/21/2021) for for jose CT. Moshe Donovan received counseling on the following healthy behaviors: nutrition and exercise  Reviewed prior labs and health maintenance  Continue current medications, diet and exercise. Discussed use, benefit, and side effects of prescribed medications. Barriers to medication compliance addressed. Patient given educational materials - see patient instructions  Was a self-tracking handout given in paper form or via [employer]? No:     Requested Prescriptions     Signed Prescriptions Disp Refills    ibuprofen (ADVIL;MOTRIN) 600 MG tablet 120 tablet 5     Sig: Take 1 tablet by mouth 4 times daily as needed for Pain    gabapentin (NEURONTIN) 300 MG capsule 30 capsule 0     Sig: Take 1 capsule by mouth nightly for 30 days. Intended supply: 30 days       All patient questions answered. Patient voiced understanding. Quality Measures    Body mass index is 34.61 kg/m². Elevated. Weight control planned discussed Healthy diet and regular exercise. BP: (!) 137/100 Blood pressure is high.  Treatment plan consists of Weight Reduction, DASH Eating Plan, Dietary Sodium Restriction, Moderation in Alcohol Consumption, Avoid Tobacco and Second-hand Smoke and Antihypertensive Medication Started.     No results found for: LDLCALC, LDLCHOLESTEROL, LDLDIRECT (goal LDL reduction with dx if diabetes is 50% LDL reduction)      PHQ Scores 5/24/2021 12/7/2020 9/10/2020 3/18/2020 10/24/2019 11/7/2018 10/3/2017   PHQ2 Score 0 0 0 0 0 0 0   PHQ9 Score 0 0 0 0 0 0 0     Interpretation of Total Score Depression Severity: 1-4 = Minimal depression, 5-9 = Mild depression, 10-14 = Moderate depression, 15-19 = Moderately severe depression, 20-27 = Severe depression

## 2021-09-21 ENCOUNTER — OFFICE VISIT (OUTPATIENT)
Dept: FAMILY MEDICINE CLINIC | Age: 41
End: 2021-09-21
Payer: COMMERCIAL

## 2021-09-21 ENCOUNTER — HOSPITAL ENCOUNTER (OUTPATIENT)
Age: 41
Setting detail: SPECIMEN
Discharge: HOME | End: 2021-09-21
Payer: COMMERCIAL

## 2021-09-21 VITALS
BODY MASS INDEX: 34.9 KG/M2 | HEART RATE: 72 BPM | DIASTOLIC BLOOD PRESSURE: 107 MMHG | SYSTOLIC BLOOD PRESSURE: 153 MMHG | WEIGHT: 197 LBS

## 2021-09-21 DIAGNOSIS — I10 ESSENTIAL HYPERTENSION: Primary | ICD-10-CM

## 2021-09-21 DIAGNOSIS — Z00.00 HEALTHCARE MAINTENANCE: ICD-10-CM

## 2021-09-21 DIAGNOSIS — G56.03 BILATERAL CARPAL TUNNEL SYNDROME: ICD-10-CM

## 2021-09-21 DIAGNOSIS — I10 ESSENTIAL HYPERTENSION: ICD-10-CM

## 2021-09-21 DIAGNOSIS — E01.0 THYROMEGALY: ICD-10-CM

## 2021-09-21 LAB
ANION GAP SERPL CALCULATED.3IONS-SCNC: 11 MMOL/L (ref 9–17)
BUN BLDV-MCNC: 14 MG/DL (ref 6–20)
BUN/CREAT BLD: NORMAL (ref 9–20)
CALCIUM SERPL-MCNC: 9.7 MG/DL (ref 8.6–10.4)
CHLORIDE BLD-SCNC: 107 MMOL/L (ref 98–107)
CHOLESTEROL/HDL RATIO: 2.9
CHOLESTEROL: 191 MG/DL
CO2: 22 MMOL/L (ref 20–31)
CREAT SERPL-MCNC: 0.56 MG/DL (ref 0.5–0.9)
GFR AFRICAN AMERICAN: >60 ML/MIN
GFR NON-AFRICAN AMERICAN: >60 ML/MIN
GFR SERPL CREATININE-BSD FRML MDRD: NORMAL ML/MIN/{1.73_M2}
GFR SERPL CREATININE-BSD FRML MDRD: NORMAL ML/MIN/{1.73_M2}
GLUCOSE BLD-MCNC: 83 MG/DL (ref 70–99)
HDLC SERPL-MCNC: 65 MG/DL
HEPATITIS C ANTIBODY: NONREACTIVE
HIV AG/AB: NONREACTIVE
LDL CHOLESTEROL: 109 MG/DL (ref 0–130)
POTASSIUM SERPL-SCNC: 4.2 MMOL/L (ref 3.7–5.3)
SODIUM BLD-SCNC: 140 MMOL/L (ref 135–144)
TRIGL SERPL-MCNC: 84 MG/DL
TSH SERPL DL<=0.05 MIU/L-ACNC: 1.43 MIU/L (ref 0.3–5)
VLDLC SERPL CALC-MCNC: NORMAL MG/DL (ref 1–30)

## 2021-09-21 PROCEDURE — 99213 OFFICE O/P EST LOW 20 MIN: CPT | Performed by: STUDENT IN AN ORGANIZED HEALTH CARE EDUCATION/TRAINING PROGRAM

## 2021-09-21 RX ORDER — PHENYLEPHRINE HYDROCHLORIDE 10 MG/1
2 TABLET, COATED ORAL DAILY
Qty: 2 EACH | Refills: 0 | Status: SHIPPED | OUTPATIENT
Start: 2021-09-21

## 2021-09-21 RX ORDER — AMLODIPINE BESYLATE 5 MG/1
5 TABLET ORAL DAILY
Qty: 30 TABLET | Refills: 0 | Status: SHIPPED | OUTPATIENT
Start: 2021-09-21 | End: 2021-10-13 | Stop reason: ALTCHOICE

## 2021-09-21 SDOH — ECONOMIC STABILITY: FOOD INSECURITY: WITHIN THE PAST 12 MONTHS, YOU WORRIED THAT YOUR FOOD WOULD RUN OUT BEFORE YOU GOT MONEY TO BUY MORE.: NEVER TRUE

## 2021-09-21 SDOH — ECONOMIC STABILITY: FOOD INSECURITY: WITHIN THE PAST 12 MONTHS, THE FOOD YOU BOUGHT JUST DIDN'T LAST AND YOU DIDN'T HAVE MONEY TO GET MORE.: NEVER TRUE

## 2021-09-21 ASSESSMENT — SOCIAL DETERMINANTS OF HEALTH (SDOH): HOW HARD IS IT FOR YOU TO PAY FOR THE VERY BASICS LIKE FOOD, HOUSING, MEDICAL CARE, AND HEATING?: NOT VERY HARD

## 2021-09-21 NOTE — PROGRESS NOTES
Visit Information    Have you changed or started any medications since your last visit including any over-the-counter medicines, vitamins, or herbal medicines? no   Have you stopped taking any of your medications? Is so, why? -  no  Are you having any side effects from any of your medications? - no    Have you seen any other physician or provider since your last visit?  no   Have you had any other diagnostic tests since your last visit?  no   Have you been seen in the emergency room and/or had an admission in a hospital since we last saw you?  no   Have you had your routine dental cleaning in the past 6 months?  no     Do you have an active MyChart account? If no, what is the barrier?   No    Patient Care Team:  Shanda Blankenship MD as PCP - General (Emergency Medicine)  West Esquivel DO as PCP - [facility] Provider  Marcelino Puri MD as Consulting Physician (Obstetrics & Gynecology)    Medical History Review  Past Medical, Family, and Social History reviewed and does not contribute to the patient presenting condition    Health Maintenance   Topic Date Due    Hepatitis C screen  Never done    Varicella vaccine (1 of 2 - 2-dose childhood series) Never done    COVID-19 Vaccine (1) Never done    HIV screen  Never done    Lipid screen  Never done    Potassium monitoring  06/12/2021    Creatinine monitoring  06/12/2021    Flu vaccine (1) 09/01/2021    DTaP/Tdap/Td vaccine (2 - Td or Tdap) 03/17/2025    Pneumococcal 0-64 years Vaccine (2 of 2 - PPSV23) 01/23/2046    Hepatitis A vaccine  Aged Out    Hepatitis B vaccine  Aged Out    Hib vaccine  Aged Out    Meningococcal (ACWY) vaccine  Aged Out

## 2021-09-21 NOTE — PROGRESS NOTES
I have reviewed and discussed key elements of [facility] with the resident including plan of care and follow up and agree with the care ty plan. enalrged thyroid. US has been ordered in the past but not completed. Will reorder as well as TSH. BP not controlled. Norvasc added today.     TWO WEEK FOLLOW UP.

## 2021-09-21 NOTE — PATIENT INSTRUCTIONS
meals  · Cut back on the amount of salt you use in cooking. This will help you adjust to the taste. Do not add salt after cooking. One teaspoon of salt has about 2,300 mg of sodium. · Take the salt shaker off the table. · Flavor your food with garlic, lemon juice, onion, vinegar, herbs, and spices. Do not use soy sauce, lite soy sauce, steak sauce, onion salt, garlic salt, celery salt, or ketchup on your food. · Use low-sodium salad dressings, sauces, and ketchup. Or make your own salad dressings and sauces without adding salt. · Use less salt (or none) when recipes call for it. You can often use half the salt a recipe calls for without losing flavor. Other foods such as rice, pasta, and grains do not need added salt. · Rinse canned vegetables, and cook them in fresh water. This removes some--but not all--of the salt. · Avoid water that is naturally high in sodium or that has been treated with water softeners, which add sodium. If you buy bottled water, read the label and choose a sodium-free brand. Avoid high-sodium foods  · Avoid eating:  ? Smoked, cured, salted, and canned meat, fish, and poultry. ? Ham, marcial, hot dogs, and luncheon meats. ? Regular, hard, and processed cheese and regular peanut butter. ? Crackers with salted tops, and other salted snack foods such as pretzels, chips, and salted popcorn. ? Frozen prepared meals, unless labeled low-sodium. ? Canned and dried soups, broths, and bouillon, unless labeled sodium-free or low-sodium. ? Canned vegetables, unless labeled sodium-free or low-sodium. ? Western Donna fries, pizza, tacos, and other fast foods. ? Pickles, olives, ketchup, and other condiments, especially soy sauce, unless labeled sodium-free or low-sodium. Where can you learn more? Go to https://scarlet.healthSens[employer]ass. org and sign in to your [employer] account.  Enter U409 in the [facility] box to learn more about \"Low Sodium Diet (2,000 Milligram): Care Instructions. \"     If you do not have an account, please click on the \"Sign Up Now\" link. Current as of: December 17, 2020               Content Version: 12.9  © 2006-2021 Healthwise, Incorporated. Care instructions adapted under license by [facility] ([facility]). If you have questions about a medical condition or this instruction, always ask your healthcare professional. [address] any warranty or liability for your use of this information.

## 2021-09-21 NOTE — PROGRESS NOTES
Tommy Singh (:  1980) is a 37 y.o. female,Established patient, here for evaluation of the following chief complaint(s):  Carpal Tunnel and Forms (update FMLA forms )         ASSESSMENT/PLAN:  1. Essential hypertension  -     Basic Metabolic Panel; Future  -     Lipid Panel; Future  -     amLODIPine (NORVASC) 5 MG tablet; Take 1 tablet by mouth daily, Disp-30 tablet, R-0Normal  2. Bilateral carpal tunnel syndrome  -     Elastic Bandages & Supports (CARPAL TUNNEL WRIST STABILIZER) MISC; DAILY Starting Tue 2021, Disp-2 each, R-0, Normal  3. Thyromegaly  -     TSH; Future  4. Healthcare maintenance  -     HIV Screen; Future  -     Hepatitis C Antibody; Future      Return in about 2 weeks (around 10/5/2021) for HTN, Lab results. Subjective   SUBJECTIVE/OBJECTIVE:  HPI   Carpal Tunnel Syndrome  Patient presents for evaluation of pain in hands, hand paresthesias and follow up on carpal tunnel syndrome. Onset of the symptoms was 2 years ago. Current symptoms include: pain involving the wrists b/l, tingling/numbness involving the ty aspect and weakness involving the hands b/l. Aggravating factors: work related keyboarding, work related repetitive activity:   , worse at night or first thing in the morning and worse with activity. Symptoms have progressed to a point and plateaued and worsened, beginning 2 years ago. Evaluation to date: none. Treatment to date: has avoided aggravating activity for 1 year, which has been somewhat effective, prescription NSAIDS, which has been somewhat effective, wrist splints used for 3 months, which has been somewhat effective and PT begun 1 year ago, which has been somewhat effective. Review of Systems   Constitutional: Positive for fatigue. Negative for appetite change and fever. HENT: Positive for trouble swallowing. Negative for ear pain, rhinorrhea and sore throat. Eyes: Negative for discharge and visual disturbance.    Respiratory: Negative for cough and shortness of breath. Cardiovascular: Negative for chest pain, palpitations and leg swelling. Gastrointestinal: Negative for abdominal pain, constipation, diarrhea, nausea and vomiting. Endocrine: Negative for polyuria. Genitourinary: Negative for dysuria. Musculoskeletal: Negative for arthralgias. Skin: Negative for rash. Neurological: Positive for numbness. Negative for dizziness, weakness, light-headedness and headaches. Psychiatric/Behavioral: Negative for agitation. Objective   Physical Exam  Constitutional:       General: She is not in acute distress. Appearance: Normal appearance. She is obese. Neck:      Comments: Thyromegaly present right greater than left. No nodules detected on palpation. No difficulty swallowing  Cardiovascular:      Rate and Rhythm: Normal rate and regular rhythm. Heart sounds: Normal heart sounds. No murmur heard. No friction rub. Pulmonary:      Breath sounds: Normal breath sounds. No wheezing or rales. Musculoskeletal:      Cervical back: Normal range of motion. Right lower leg: No edema. Left lower leg: No edema. Skin:     Findings: No rash. Neurological:      Mental Status: She is alert and oriented to person, place, and time. Psychiatric:         Mood and Affect: Mood normal.              An electronic signature was used to authenticate this note.     --Jose Hearn MD

## 2021-09-22 ASSESSMENT — ENCOUNTER SYMPTOMS
TROUBLE SWALLOWING: 1
NAUSEA: 0
CONSTIPATION: 0
SORE THROAT: 0
DIARRHEA: 0
SHORTNESS OF BREATH: 0
COUGH: 0
EYE DISCHARGE: 0
VOMITING: 0
RHINORRHEA: 0
ABDOMINAL PAIN: 0

## 2021-10-13 ENCOUNTER — OFFICE VISIT (OUTPATIENT)
Dept: FAMILY MEDICINE CLINIC | Age: 41
End: 2021-10-13
Payer: COMMERCIAL

## 2021-10-13 VITALS
BODY MASS INDEX: 35.71 KG/M2 | SYSTOLIC BLOOD PRESSURE: 140 MMHG | HEART RATE: 66 BPM | DIASTOLIC BLOOD PRESSURE: 89 MMHG | WEIGHT: 201.6 LBS

## 2021-10-13 DIAGNOSIS — I10 ESSENTIAL HYPERTENSION: Primary | ICD-10-CM

## 2021-10-13 DIAGNOSIS — E01.0 THYROMEGALY: ICD-10-CM

## 2021-10-13 PROCEDURE — 99213 OFFICE O/P EST LOW 20 MIN: CPT | Performed by: STUDENT IN AN ORGANIZED HEALTH CARE EDUCATION/TRAINING PROGRAM

## 2021-10-13 RX ORDER — BLOOD PRESSURE TEST KIT
1 KIT MISCELLANEOUS 2 TIMES DAILY
Qty: 1 KIT | Refills: 0 | Status: SHIPPED | OUTPATIENT
Start: 2021-10-13

## 2021-10-13 RX ORDER — AMLODIPINE BESYLATE 10 MG/1
10 TABLET ORAL DAILY
Qty: 30 TABLET | Refills: 2 | Status: SHIPPED | OUTPATIENT
Start: 2021-10-13 | End: 2022-07-11 | Stop reason: SDUPTHER

## 2021-10-13 ASSESSMENT — ENCOUNTER SYMPTOMS
SORE THROAT: 0
NAUSEA: 0
ABDOMINAL PAIN: 0
EYE DISCHARGE: 0
COUGH: 0
RHINORRHEA: 0
SHORTNESS OF BREATH: 0
VOMITING: 0
CONSTIPATION: 0
DIARRHEA: 0

## 2021-10-13 NOTE — PROGRESS NOTES
Fish Lynch (:  1980) is a 37 y.o. female,Established patient, here for evaluation of the following chief complaint(s):  Follow-up (lab results)       ASSESSMENT/PLAN:  1. Essential hypertension  -     Start amLODIPine (NORVASC) 10 MG tablet; Take 1 tablet by mouth daily, Disp-30 tablet, R-2Normal   - Discontinue Norvasc 5 mg tab PO QD.   - DME order for Blood pressure monitor kit. -  Educated on Diet and exercise. 2. Thyromegaly  -     US THYROID; Future  -  Reviewed labs TSH 1.43 wnl      Return in about 1 month (around 2021) for HTN. Subjective   SUBJECTIVE/OBJECTIVE:  HPI   Hypertension:  Home blood pressure monitoring: No.  She is adherent to a low sodium diet. Patient complains of headache and fatigue. Antihypertensive medication side effects: excessive urination. Use of agents associated with hypertension: NSAIDS. Smokes 1/4 ppd. Reports medication compliance most of the time. Not exercise. Sodium (mmol/L)   Date Value   2021 140    BUN (mg/dL)   Date Value   2021 14    Glucose (mg/dL)   Date Value   2021 83      Potassium (mmol/L)   Date Value   2021 4.2    CREATININE (mg/dL)   Date Value   2021 0.56           Review of Systems   Constitutional: Positive for fatigue. Negative for appetite change and fever. HENT: Negative for ear pain, rhinorrhea and sore throat. Eyes: Negative for discharge and visual disturbance. Respiratory: Negative for cough and shortness of breath. Cardiovascular: Negative for chest pain and palpitations. Gastrointestinal: Negative for abdominal pain, constipation, diarrhea, nausea and vomiting. Endocrine: Negative for polyuria. Genitourinary: Negative for dysuria. Musculoskeletal: Negative for arthralgias. Skin: Negative for rash. Neurological: Positive for headaches. Negative for dizziness, weakness and light-headedness.    Psychiatric/Behavioral: Negative for agitation. Objective   Physical Exam  Constitutional:       General: She is not in acute distress. Appearance: Normal appearance. She is obese. Neck:      Comments: thromegaly  Cardiovascular:      Rate and Rhythm: Normal rate and regular rhythm. Heart sounds: Normal heart sounds. No murmur heard. No friction rub. Pulmonary:      Breath sounds: Normal breath sounds. No wheezing or rales. Musculoskeletal:      Right lower leg: No edema. Left lower leg: No edema. Neurological:      Mental Status: She is alert and oriented to person, place, and time. Psychiatric:         Mood and Affect: Mood normal.                An electronic signature was used to authenticate this note.     --Robert Espino MD

## 2021-10-13 NOTE — PATIENT INSTRUCTIONS
Patient Education        Goiter: Care Instructions  Your Care Instructions     A goiter is an enlarged thyroid gland. It can cause swelling in your neck. Your thyroid is found in the front of your neck. It makes a hormone that controls how your body uses energy. Goiters are caused by different things. Some are caused by high or low levels of thyroid hormone. Others are caused by too little iodine in the diet, or a growth or disease in the thyroid. In the [place], most goiters are caused by long-term autoimmune thyroiditis. This is also called Hashimoto's thyroiditis. It happens when the body's immune system damages the thyroid. You may take thyroid hormone to reduce the size of your goiter. Or you may need surgery or radioactive iodine treatment. Some people don't need any treatment. They only need to watch for changes in the goiter. Follow-up care is a key part of your treatment and safety. Be sure to make and go to all appointments, and call your doctor if you are having problems. It's also a good idea to know your test results and keep a list of the medicines you take. How can you care for yourself at home? · Be safe with medicines. Take your medicines exactly as prescribed. Call your doctor if you think you are having a problem with your medicine. You will get more details on the specific medicines your doctor prescribes. When should you call for help? Call 911 anytime you think you may need emergency care. For example, call if:    · You have trouble breathing. Watch closely for changes in your health, and be sure to contact your doctor if:    · Your eyes turn red and bulge.     · You have trouble swallowing.     · You feel very tired or weak.     · You lose weight but are eating the same or more than usual.   Where can you learn more? Go to https://chpejuliaeb.[employer]. org and sign in to your [employer] account.  Enter Z997 in the [employer] box to learn more about \"Goiter:

## 2021-10-13 NOTE — PROGRESS NOTES
Visit Information    Have you changed or started any medications since your last visit including any over-the-counter medicines, vitamins, or herbal medicines? no   Are you having any side effects from any of your medications? -  no  Have you stopped taking any of your medications? Is so, why? -  no    Have you seen any other physician or provider since your last visit? No  Have you had any other diagnostic tests since your last visit? No  Have you been seen in the emergency room and/or had an admission to a hospital since we last saw you? No  Have you had your routine dental cleaning in the past 6 months? no    Have you activated your [employer] account? If not, what are your barriers?  No:      Patient Care Team:  Alvaro Zavala MD as PCP - General (Emergency Medicine)  Cherelle Lackey DO as PCP - [facility]  Silvia Cintron MD as Consulting Physician (Obstetrics & Gynecology)    Medical History Review  Past Medical, Family, and Social History reviewed and does not contribute to the patient presenting condition    Health Maintenance   Topic Date Due    Varicella vaccine (1 of 2 - 2-dose childhood series) Never done    COVID-19 Vaccine (1) Never done    Flu vaccine (1) 09/01/2021    Potassium monitoring  09/21/2022    Creatinine monitoring  09/21/2022    DTaP/Tdap/Td vaccine (2 - Td or Tdap) 03/17/2025    Lipid screen  09/21/2026    Pneumococcal 0-64 years Vaccine (2 of 2 - PPSV23) 01/23/2046    Hepatitis C screen  Completed    HIV screen  Completed    Hepatitis A vaccine  Aged Out    Hepatitis B vaccine  Aged Out    Hib vaccine  Aged Out    Meningococcal (ACWY) vaccine  Aged Out

## 2022-02-09 ENCOUNTER — TELEPHONE (OUTPATIENT)
Dept: FAMILY MEDICINE CLINIC | Age: 42
End: 2022-02-09

## 2022-02-09 NOTE — TELEPHONE ENCOUNTER
Working off the HTN list, left voicemail for patient to contact office to reschedule her appt that was cancelled in 11/21.

## 2022-02-23 DIAGNOSIS — I10 ESSENTIAL HYPERTENSION: ICD-10-CM

## 2022-02-23 RX ORDER — LISINOPRIL AND HYDROCHLOROTHIAZIDE 20; 12.5 MG/1; MG/1
TABLET ORAL
Qty: 30 TABLET | Refills: 5 | Status: SHIPPED | OUTPATIENT
Start: 2022-02-23

## 2022-03-03 ENCOUNTER — OFFICE VISIT (OUTPATIENT)
Dept: FAMILY MEDICINE CLINIC | Age: 42
End: 2022-03-03
Payer: COMMERCIAL

## 2022-03-03 VITALS
TEMPERATURE: 97.2 F | WEIGHT: 200 LBS | SYSTOLIC BLOOD PRESSURE: 130 MMHG | HEART RATE: 79 BPM | BODY MASS INDEX: 35.44 KG/M2 | HEIGHT: 63 IN | DIASTOLIC BLOOD PRESSURE: 86 MMHG

## 2022-03-03 DIAGNOSIS — Z72.0 TOBACCO USE: ICD-10-CM

## 2022-03-03 DIAGNOSIS — G56.03 BILATERAL CARPAL TUNNEL SYNDROME: Primary | ICD-10-CM

## 2022-03-03 DIAGNOSIS — I10 ESSENTIAL HYPERTENSION: ICD-10-CM

## 2022-03-03 PROCEDURE — 99213 OFFICE O/P EST LOW 20 MIN: CPT | Performed by: STUDENT IN AN ORGANIZED HEALTH CARE EDUCATION/TRAINING PROGRAM

## 2022-03-03 RX ORDER — ACETAMINOPHEN 500 MG
TABLET ORAL
Qty: 120 TABLET | Refills: 1 | Status: SHIPPED | OUTPATIENT
Start: 2022-03-03 | End: 2022-07-11 | Stop reason: SDUPTHER

## 2022-03-03 ASSESSMENT — ENCOUNTER SYMPTOMS
COUGH: 0
SHORTNESS OF BREATH: 0
NAUSEA: 0
ABDOMINAL PAIN: 0
DIARRHEA: 0
RHINORRHEA: 0
CONSTIPATION: 0
SORE THROAT: 0
EYE DISCHARGE: 0
VOMITING: 0

## 2022-03-03 NOTE — PROGRESS NOTES
Visit Information    Have you changed or started any medications since your last visit including any over-the-counter medicines, vitamins, or herbal medicines? no   Have you stopped taking any of your medications? Is so, why? -  no  Are you having any side effects from any of your medications? - no    Have you seen any other physician or provider since your last visit?  no   Have you had any other diagnostic tests since your last visit?  no   Have you been seen in the emergency room and/or had an admission in a hospital since we last saw you?  no   Have you had your routine dental cleaning in the past 6 months?  no     Do you have an active MyChart account? If no, what is the barrier?   No:     Patient Care Team:  Chrissie Cosme MD as PCP - General (Emergency Medicine)  [facility]  Neli Asencio MD as Consulting Physician (Obstetrics & Gynecology)    Medical History Review  Past Medical, Family, and Social History reviewed and does contribute to the patient presenting condition    Health Maintenance   Topic Date Due    Varicella vaccine (1 of 2 - 2-dose childhood series) Never done    COVID-19 Vaccine (1) Never done    Flu vaccine (1) 09/01/2021    Depression Screen  05/24/2022    Potassium monitoring  09/21/2022    Creatinine monitoring  09/21/2022    DTaP/Tdap/Td vaccine (2 - Td or Tdap) 03/17/2025    Lipid screen  09/21/2026    Pneumococcal 0-64 years Vaccine (2 of 2 - PPSV23) 01/23/2046    Hepatitis C screen  Completed    HIV screen  Completed    Hepatitis A vaccine  Aged Out    Hepatitis B vaccine  Aged Out    Hib vaccine  Aged Out    Meningococcal (ACWY) vaccine  Aged Out

## 2022-03-03 NOTE — PROGRESS NOTES
Bear Goetz (:  1980) is a 40 y.o. female,Established patient, here for evaluation of the following chief complaint(s):  Hypertension (follow up / Patient states she will still get headaches from time to time ) and Other (LA ppw )         ASSESSMENT/PLAN:  1. Bilateral carpal tunnel syndrome  -     EMG; Future  -     [facility]  -     Continue acetaminophen (ACETAMINOPHEN EXTRA STRENGTH) 500 MG tablet; TAKE 1 TABLET BY MOUTH TWO TIMES A DAY AS NEEDED FOR PAIN, Disp-120 tablet, R-1Normal  - Will complete and fax [facility] paperwork please see patient's media. 2. Essential hypertension  -Asymptomatic well-controlled  3. Tobacco use   -Patient reports recently quitting cigarettes use cold turkey for past month. -    Start nicotine polacrilex (NICORETTE) 2 MG gum; Take 1 each by mouth as needed for Smoking cessation, Disp-110 each, R-3Normal    Note next visit address patient's right hip pain. Return in about 3 months (around 6/3/2022) for HTN, tobacco use. Subjective   SUBJECTIVE/OBJECTIVE:  HPI     Carpal Tunnel Syndrome  Patient presents for evaluation of pain in hands, hand paresthesias and follow up on carpal tunnel syndrome. Onset of the symptoms was 4 years ago. Current symptoms include: pain involving the b/l hands, tingling/numbness involving the b/l fingers 1-5 and weakness involving the b/l hands. Aggravating factors: repetitive activity: lifting, work related repetitive activity: lifting, repairs, day to day work activity. , worse at night or first thing in the morning and worse with activity. Symptoms have progressed to a point and plateaued. Evaluation to date: none.  Treatment to date: has avoided aggravating activity for 3 years, which has been somewhat effective, OTC analgesics, which has been somewhat effective, prescription NSAIDS, which has been somewhat effective and wrist splints used for 3 years, which has been somewhat effective. Review of Systems   Constitutional: Negative for appetite change, fatigue and fever. HENT: Negative for ear pain, rhinorrhea and sore throat. Eyes: Negative for discharge and visual disturbance. Respiratory: Negative for cough and shortness of breath. Cardiovascular: Negative for chest pain and palpitations. Gastrointestinal: Negative for abdominal pain, constipation, diarrhea, nausea and vomiting. Endocrine: Negative for polyuria. Genitourinary: Negative for dysuria. Musculoskeletal: Positive for arthralgias. Skin: Negative for rash. Neurological: Negative for dizziness, weakness, light-headedness and headaches. Psychiatric/Behavioral: Negative for agitation. Objective   Physical Exam  Constitutional:       General: She is not in acute distress. Appearance: Normal appearance. She is obese. Cardiovascular:      Rate and Rhythm: Normal rate and regular rhythm. Heart sounds: Normal heart sounds. No murmur heard. No friction rub. Pulmonary:      Breath sounds: Normal breath sounds. No wheezing or rales. Musculoskeletal:         General: No swelling or tenderness. Normal range of motion. Comments: Positive phanel test.    Skin:     Findings: No rash. Neurological:      Mental Status: She is alert and oriented to person, place, and time. Psychiatric:         Mood and Affect: Mood normal.              An electronic signature was used to authenticate this note.     --Jnaina Bradley MD

## 2022-03-04 ENCOUNTER — TELEPHONE (OUTPATIENT)
Dept: FAMILY MEDICINE CLINIC | Age: 42
End: 2022-03-04

## 2022-03-04 NOTE — PROGRESS NOTES
Attending Physician Statement  I  have discussed the care of Yaneth Mustafa including pertinent history and exam findings with the resident. I agree with the assessment, plan and orders as documented by the resident. /86 (Site: Right Upper Arm, Position: Sitting, Cuff Size: Medium Adult)   Pulse 79   Temp 97.2 °F (36.2 °C)   Ht 5' 2.99\" (1.6 m)   Wt 200 lb (90.7 kg)   LMP 05/17/2015 Comment: blood hcg negative today  BMI 35.44 kg/m²    BP Readings from Last 3 Encounters:   03/03/22 130/86   10/13/21 (!) 140/89   09/21/21 (!) 153/107     Wt Readings from Last 3 Encounters:   03/03/22 200 lb (90.7 kg)   10/13/21 201 lb 9.6 oz (91.4 kg)   09/21/21 197 lb (89.4 kg)          Diagnosis Orders   1. Bilateral carpal tunnel syndrome  EMG    42 [facility]    acetaminophen (ACETAMINOPHEN EXTRA STRENGTH) 500 MG tablet   2. Essential hypertension     3.  Tobacco use  nicotine polacrilex (NICORETTE) 2 MG gum       Shanique Egan DO 3/4/2022 6:22 PM

## 2022-03-04 NOTE — TELEPHONE ENCOUNTER
Completed FMLA paperwork from [facility] faxed and scanned to [employer].   Original mailed to pt

## 2022-03-17 ENCOUNTER — TELEPHONE (OUTPATIENT)
Dept: FAMILY MEDICINE CLINIC | Age: 42
End: 2022-03-17

## 2022-03-17 NOTE — TELEPHONE ENCOUNTER
FMLA paperwork need update with corrections, the part that states pt has seen ortho and sports medicine would need to also stated she has carpal tunnel. The part for intermittent would need to be completed and checked. Writer placed in provider mailbox for corrections.

## 2022-03-23 ENCOUNTER — TELEPHONE (OUTPATIENT)
Dept: FAMILY MEDICINE CLINIC | Age: 42
End: 2022-03-23

## 2022-03-23 NOTE — TELEPHONE ENCOUNTER
----- Message from Lauryn Schuster sent at 3/23/2022  1:07 PM EDT -----  Subject: Message to Provider    QUESTIONS  Information for Provider? URGENT? patient need update on status of Fmla   paperworks need corrections . the part that states pt section 3 need to be   completed need to describe her condition which is carpal tunnel FMLA   paperwork was due on 3/6/22 , sent message on 3/17/22 and no response   ---------------------------------------------------------------------------  --------------  CALL BACK INFO  What is the best way for the office to contact you? OK to leave message on   voicemail  Preferred Call Back Phone Number? [phone]  ---------------------------------------------------------------------------  --------------  SCRIPT ANSWERS  Relationship to Patient?  Self

## 2022-03-24 ENCOUNTER — OFFICE VISIT (OUTPATIENT)
Dept: ORTHOPEDIC SURGERY | Age: 42
End: 2022-03-24
Payer: COMMERCIAL

## 2022-03-24 VITALS — WEIGHT: 200 LBS | HEIGHT: 63 IN | BODY MASS INDEX: 35.44 KG/M2

## 2022-03-24 DIAGNOSIS — M79.642 BILATERAL HAND PAIN: Primary | ICD-10-CM

## 2022-03-24 DIAGNOSIS — M79.641 BILATERAL HAND PAIN: Primary | ICD-10-CM

## 2022-03-24 DIAGNOSIS — G56.03 BILATERAL CARPAL TUNNEL SYNDROME: Primary | ICD-10-CM

## 2022-03-24 PROCEDURE — 99203 OFFICE O/P NEW LOW 30 MIN: CPT

## 2022-03-24 NOTE — TELEPHONE ENCOUNTER
This is a patient of Dr. Mason Olson. Inform the patient he is on vacation and will address when he returns.

## 2022-03-24 NOTE — PROGRESS NOTES
[address] [facility]  [id number] [facility] [address]  Dept: [phone]    Orthopaedic Clinic - New Patient      CHIEF COMPLAINT:    Chief Complaint   Patient presents with    Hand Pain     bilateral       HISTORY OF PRESENT ILLNESS:    The patient is a 40 y.o. female who is being seen as a new patient for bilateral carpal tunnel is referral from Dr. Brooklyn Najera. She states that she has been having bilateral arm pain that shoots down her arms. She states this started roughly 4-1/2 years ago she started working a [employer] roughly 5 years ago. 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 She denies any trauma to the bilateral upper extremities. She states that she does have braces that she wears at work they give her mild relief. She states that she has symptoms all day. She does have symptoms at night that wakes her up in the night. She states that keeping her wrist straight alleviates some of the pain. She points of numbness and tingling to all of the digits are worse complain of shooting pains. She has a past medical history of hypertension, obesity, and tobacco use.     Past Medical History:    Past Medical History:   Diagnosis Date    Abdominal pain     Bilateral carpal tunnel syndrome 11/7/2018    Fibroids     HTN (hypertension) 1/30/2015    Menometrorrhagia     Obesity (BMI 30.0-34.9) 5/26/2015       Past Surgical History:    Past Surgical History:   Procedure Laterality Date    HYSTERECTOMY, TOTAL ABDOMINAL  5/26/15    KNEE SURGERY      left    ESTELA AND BSO  5/26/15       Current Medications:   Current Outpatient Medications   Medication Sig Dispense Refill    nicotine polacrilex (NICORETTE) 2 MG gum Take 1 each by mouth as needed for Smoking cessation 110 each 3    acetaminophen (ACETAMINOPHEN EXTRA STRENGTH) 500 MG tablet TAKE 1 TABLET BY MOUTH TWO TIMES A DAY AS NEEDED FOR PAIN 120 tablet 1    lisinopril-hydroCHLOROthiazide (PRINZIDE;ZESTORETIC) 20-12.5 MG per tablet TAKE 1 TABLET BY MOUTH ONE TIME A DAY 30 tablet 5    amLODIPine (NORVASC) 10 MG tablet Take 1 tablet by mouth daily 30 tablet 2    Blood Pressure KIT 1 box by Does not apply route 2 times daily 1 kit 0    Elastic Bandages & Supports (CARPAL TUNNEL WRIST STABILIZER) MISC 2 Units by Does not apply route daily 2 each 0    ibuprofen (ADVIL;MOTRIN) 600 MG tablet Take 1 tablet by mouth 4 times daily as needed for Pain 120 tablet 5    gabapentin (NEURONTIN) 300 MG capsule Take 1 capsule by mouth nightly for 30 days. Intended supply: 30 days 30 capsule 0    Elastic Bandages & Supports MISC Carpal tunnel splint elastic size small/med for both hands 2 each 0    Elastic Bandages & Supports (WRIST SPLINT ELASTIC/SMALL/MED) MISC Apply to wrist nightly 2 each 0    Elastic Bandages & Supports (WRIST SPLINT LEFT/RIGHT) MISC Use as instructed on both hands 2 each 0     No current facility-administered medications for this visit. Allergies:    Patient has no known allergies. Social History:   Social History     Socioeconomic History    Marital status: Single     Spouse name: Not on file    Number of children: Not on file    Years of education: Not on file    Highest education level: Not on file   Occupational History    Not on file   Tobacco Use    Smoking status: Current Some Day Smoker     Packs/day: 0.25     Years: 15.00     Pack years: 3.75    Smokeless tobacco: Never Used   Substance and Sexual Activity    Alcohol use:  Yes     Alcohol/week: 0.0 standard drinks     Comment: occassionally    Drug use: No    Sexual activity: Not Currently   Other Topics Concern    Not on file   Social History Narrative    Not on file     Social Determinants of Health     Financial Resource Strain: Low Risk     Difficulty of Paying Living Expenses: Not very hard   Food Insecurity: No Food Insecurity    Worried About Running Out of Food in the Last Year: Never true    Ran Out of Food in the Last Year: Never true   Transportation Needs:     Lack of Transportation (Medical): Not on file    Lack of Transportation (Non-Medical):  Not on file   Physical Activity:     Days of Exercise per Week: Not on file    Minutes of Exercise per Session: Not on file   Stress:     Feeling of Stress : Not on file   Social Connections:     Frequency of Communication with Friends and Family: Not on file    Frequency of Social Gatherings with Friends and Family: Not on file    Attends Rastafari Services: Not on file    Active Member of Clubs or Organizations: Not on file    Attends Club or Organization Meetings: Not on file    Marital Status: Not on file   Intimate Partner Violence:     Fear of Current or Ex-Partner: Not on file    Emotionally Abused: Not on file    Physically Abused: Not on file    Sexually Abused: Not on file   Housing Stability:     Unable to Pay for Housing in the Last Year: Not on file    Number of Places Lived in the Last Year: Not on file    Unstable Housing in the Last Year: Not on file       Family History:  Family History   Problem Relation Age of Onset    Diabetes Mother     Arthritis Neg Hx     Asthma Neg Hx     Birth Defects Neg Hx     Cancer Neg Hx     Depression Neg Hx     Early Death Neg Hx     Hearing Loss Neg Hx     Heart Disease Neg Hx     High Blood Pressure Neg Hx     High Cholesterol Neg Hx     Kidney Disease Neg Hx     Learning Disabilities Neg Hx     Mental Illness Neg Hx     Mental Retardation Neg Hx     Miscarriages / Stillbirths Neg Hx     Stroke Neg Hx     Vision Loss Neg Hx     Substance Abuse Neg Hx     Other Neg Hx          REVIEW OF SYSTEMS:  Review of Systems    Gen: No fever, chills, malaise  CV: No chest pain or palpitations  Resp: No cough or shortness of breath  GI: No nausea, vomiting, diarrhea, or constipation  Neuro: No seizures, vertigo, or headaches  Msk: Bilateral hand pain, bilateral upper extremity numbness and tingling  10 remaining systems reviewed and negative      PHYSICAL EXAM:  Ht 5' 3\" (1.6 m)   Wt 200 lb (90.7 kg)   LMP 05/17/2015 Comment: blood hcg negative today  BMI 35.43 kg/m² Body mass index is 35.43 kg/m². Physical Exam  Gen: alert and oriented, no acute distress  Psych: Appropriate affect; Appropriate knowledge base; Appropriate mood; No hallucinations  Head: normocephalic atraumatic   Chest: symmetric chest excursion  Ortho Exam  MSK:  RUE: Skin intact, no abrasion, or lacerations. No thenar or hypothenar muscular atrophy. Full active ROM. Hyperalgesia on exam with positive Tinel's sign at the carpal and cubital tunnel. Positive reverse Phalen's test.  Positive Durkan's test. He does have dysesthesia's to all of the digits, otherwise Axil/MSC/Med/Rad/Ulnar n sensation intact to light touch. Compartments soft. Med/Rad/Ulnar/AIN/PIN motor intact. Radial pulse 2+. LUE: Skin intact, no abrasion, or lacerations. No thenar or hypothenar muscular atrophy. Full active ROM. Hyperalgesia on exam with positive Tinel's sign at the carpal and cubital tunnel. Positive reverse Phalen's test.  Positive Durkan's test. He does have dysesthesia's to all of the digits, otherwise Axil/MSC/Med/Rad/Ulnar n sensation intact to light touch. Compartments soft. Med/Rad/Ulnar/AIN/PIN motor intact. Radial pulse 2+. Radiology:   History: Right hand pain    Comparison: None    Findings: X-ray films of the right hand (AP/lateral/oblique) in a skeletally mature individual demonstrating no acute osseous abnormalities. Preserved joint spaces throughout all her carpal metacarpal and phalangeal joints. No widening at the SL joint. Capitate is well centered in the lunate on lateral view. Intact Gilula's lines. No dislocations noted.   No lytic or blastic lesions noted    Impression: Acute osseous abnormalities      History: Left hand pain    Comparison: None    Findings: X-ray films of the left hand (AP/lateral/oblique) in a skeletally mature individual demonstrating no acute osseous abnormalities. Preserved joint spaces throughout all her carpal metacarpal and phalangeal joints. No widening at the SL joint. Capitate is well centered in the lunate on lateral view. Intact Gilula's lines. No dislocations noted. No lytic or blastic lesions noted    Impression: No acute osseous abnormalities      ASSESSMENT:  40 y.o. female with bilateral carpal tunnel and possible bilateral cubital tunnel    PLAN:  -Significant discussion occurred with the patient regarding the etiology of her hand pain and numbness. We discussed different treatment options including bracing, injections, and surgery.  -With numbness to all of the digits and a positive tinel's sign at the elbow, I recommend obtaining EMG studies to rule out any other sites of impingement before moving forward with surgery  -Bilateral upper extremity EMG's were ordered today.  -She will continue wearing braces at work and I encouraged her to wear her braces at night to help with nighttime pain.  -Follow-up after EMG studies. Electronically signed by Deanna Giles DO PGY-1 on 3/24/2022 at 11:41 AM    This note is created with the assistance of a speech recognition program.  While intending to generate a document that actually reflects the content of the visit, the document can still have some errors including those of syntax and sound a like substitutions which may escape proof reading.   In such instances, actual meaning can be extrapolated by contextual diversion

## 2022-04-15 NOTE — TELEPHONE ENCOUNTER
Updated by provider, scanned into media and pt notified, mailed patient copy.  Faxed to [employer]

## 2022-06-20 ENCOUNTER — TELEPHONE (OUTPATIENT)
Dept: FAMILY MEDICINE CLINIC | Age: 42
End: 2022-06-20

## 2022-07-11 ENCOUNTER — OFFICE VISIT (OUTPATIENT)
Dept: FAMILY MEDICINE CLINIC | Age: 42
End: 2022-07-11
Payer: COMMERCIAL

## 2022-07-11 VITALS
DIASTOLIC BLOOD PRESSURE: 108 MMHG | BODY MASS INDEX: 36.46 KG/M2 | HEIGHT: 63 IN | HEART RATE: 75 BPM | WEIGHT: 205.8 LBS | SYSTOLIC BLOOD PRESSURE: 158 MMHG | TEMPERATURE: 97 F

## 2022-07-11 DIAGNOSIS — G56.03 BILATERAL CARPAL TUNNEL SYNDROME: ICD-10-CM

## 2022-07-11 DIAGNOSIS — I10 ESSENTIAL HYPERTENSION: Primary | ICD-10-CM

## 2022-07-11 PROCEDURE — 99213 OFFICE O/P EST LOW 20 MIN: CPT | Performed by: STUDENT IN AN ORGANIZED HEALTH CARE EDUCATION/TRAINING PROGRAM

## 2022-07-11 RX ORDER — AMLODIPINE BESYLATE 10 MG/1
10 TABLET ORAL DAILY
Qty: 30 TABLET | Refills: 2 | Status: SHIPPED | OUTPATIENT
Start: 2022-07-11

## 2022-07-11 RX ORDER — ACETAMINOPHEN 500 MG
TABLET ORAL
Qty: 120 TABLET | Refills: 1 | Status: SHIPPED | OUTPATIENT
Start: 2022-07-11

## 2022-07-11 ASSESSMENT — PATIENT HEALTH QUESTIONNAIRE - PHQ9
SUM OF ALL RESPONSES TO PHQ QUESTIONS 1-9: 0
SUM OF ALL RESPONSES TO PHQ9 QUESTIONS 1 & 2: 0
SUM OF ALL RESPONSES TO PHQ QUESTIONS 1-9: 0
1. LITTLE INTEREST OR PLEASURE IN DOING THINGS: 0
SUM OF ALL RESPONSES TO PHQ QUESTIONS 1-9: 0
2. FEELING DOWN, DEPRESSED OR HOPELESS: 0
DEPRESSION UNABLE TO ASSESS: PT REFUSES
SUM OF ALL RESPONSES TO PHQ QUESTIONS 1-9: 0

## 2022-07-11 ASSESSMENT — ENCOUNTER SYMPTOMS
DIARRHEA: 0
NAUSEA: 0
SHORTNESS OF BREATH: 0
EYE DISCHARGE: 0
COUGH: 0
CONSTIPATION: 0
RHINORRHEA: 0
ABDOMINAL PAIN: 0
SORE THROAT: 0
VOMITING: 0

## 2022-07-11 NOTE — PATIENT INSTRUCTIONS
Thank you for letting us take care of you today. We hope all your questions were addressed. If a question was overlooked or something else comes to mind after you return home, please contact a member of your Care Team listed below. Your Care Team at [place] [address] is Team #2  Annelise Bonilla DO (Faculty)  Sarina Thorne (Faculty)  Antwan Roland MD (Resident)  Elsa Daigle MD (Resident)  Fozia Sanches MD (Resident)  Sorin Arredondo MD (Resident)  Rafael Lainez., ZARA Clarke.,  DIONY Castano., LPN Lovenia Brunner., edna [facility] office)  Iris Richard, [address] (Clinical Practice Manager)  Mary Armendariz, [address] [facility] (Clinical Pharmacist)     Office phone number: [phone]    If you need to get in right away due to illness, please be advised we have \"Same Day\" appointments available Monday-Friday. Please call us at [phone] option #3 to schedule your \"Same Day\" appointment. Patient Education        Low Sodium Diet (2,000 Milligram): Care Instructions  Overview     Limiting sodium can be an important part of managing some health problems. The most common source of sodium is salt. People get most of the salt in their diet from canned, prepared, and packaged foods. Fast food and restaurant meals also are very high in sodium. Your doctor will probably limit your sodium to less than 2,000 milligrams (mg) a day. This limit counts all the sodium inprepared and packaged foods and any salt you add to your food. Follow-up care is a key part of your treatment and safety. Be sure to make and go to all appointments, and call your doctor if you are having problems. It's also a good idea to know your test results and keep alist of the medicines you take. How can you care for yourself at home? Read food labels   Read labels on cans and food packages. The labels tell you how much sodium is in each serving. Make sure that you look at the serving size.  If you eat more than the serving size, you have eaten more sodium.  Food labels also tell you the Percent Daily Value for sodium. Choose products with low Percent Daily Values for sodium.  Be aware that sodium can come in forms other than salt, including monosodium glutamate (MSG), sodium citrate, and sodium bicarbonate (baking soda). MSG is often added to Asian food. When you eat out, you can sometimes ask for food without MSG or added salt. Buy low-sodium foods   Buy foods that are labeled \"unsalted\" (no salt added), \"sodium-free\" (less than 5 mg of sodium per serving), or \"low-sodium\" (140 mg or less of sodium per serving). Foods labeled \"reduced-sodium\" and \"light sodium\" may still have too much sodium. Be sure to read the label to see how much sodium you are getting.  Buy fresh vegetables, or frozen vegetables without added sauces. Buy low-sodium versions of canned vegetables, soups, and other canned goods. Prepare low-sodium meals   Cut back on the amount of salt you use in cooking. This will help you adjust to the taste. Do not add salt after cooking. One teaspoon of salt has about 2,300 mg of sodium.  Take the salt shaker off the table.  Flavor your food with garlic, lemon juice, onion, vinegar, herbs, and spices. Do not use soy sauce, lite soy sauce, steak sauce, onion salt, garlic salt, celery salt, or ketchup on your food.  Use low-sodium salad dressings, sauces, and ketchup. Or make your own salad dressings and sauces without adding salt.  Use less salt (or none) when recipes call for it. You can often use half the salt a recipe calls for without losing flavor. Other foods such as rice, pasta, and grains do not need added salt.  Rinse canned vegetables, and cook them in fresh water. This removes some--but not all--of the salt.  Avoid water that is naturally high in sodium or that has been treated with water softeners, which add sodium. If you buy bottled water, read the label and choose a sodium-free brand.   Avoid high-sodium foods   Avoid

## 2022-07-11 NOTE — PROGRESS NOTES
Fish Lynch (:  1980) is a 40 y.o. female,Established patient, here for evaluation of the following chief complaint(s):  Hypertension (follow up no headaches ) and Arm Injury (right arm pain )         ASSESSMENT/PLAN:  1. Essential hypertension  -     amLODIPine (NORVASC) 10 MG tablet; Take 1 tablet by mouth daily, Disp-30 tablet, R-2Normal  2. Bilateral carpal tunnel syndrome  -     acetaminophen (ACETAMINOPHEN EXTRA STRENGTH) 500 MG tablet; TAKE 1 TABLET BY MOUTH TWO TIMES A DAY AS NEEDED FOR PAIN, Disp-120 tablet, R-1Normal      Return in about 1 month (around 2022) for HTN. Subjective   SUBJECTIVE/OBJECTIVE:  HPI     Hypertension:  Home blood pressure monitoring: No.  She is not adherent to a low sodium diet. Patient denies chest pain, shortness of breath, blurred vision, peripheral edema, palpitations, dry cough and fatigue. Antihypertensive medication side effects: no medication side effects noted. Use of agents associated with hypertension: NSAIDS. Sodium (mmol/L)   Date Value   2021 140    BUN (mg/dL)   Date Value   2021 14    Glucose (mg/dL)   Date Value   2021 83      Potassium (mmol/L)   Date Value   2021 4.2    CREATININE (mg/dL)   Date Value   2021 0.56           Review of Systems   Constitutional: Negative for appetite change, fatigue and fever. HENT: Negative for ear pain, rhinorrhea and sore throat. Eyes: Negative for discharge and visual disturbance. Respiratory: Negative for cough and shortness of breath. Cardiovascular: Negative for chest pain and palpitations. Gastrointestinal: Negative for abdominal pain, constipation, diarrhea, nausea and vomiting. Endocrine: Negative for polyuria. Genitourinary: Negative for dysuria. Musculoskeletal: Positive for arthralgias. Skin: Negative for rash. Neurological: Positive for headaches.  Negative for dizziness, weakness and light-headedness. Psychiatric/Behavioral: Negative for agitation. Objective   Physical Exam  Constitutional:       General: She is not in acute distress. Appearance: Normal appearance. She is obese. HENT:      Nose: No rhinorrhea. Cardiovascular:      Rate and Rhythm: Normal rate and regular rhythm. Heart sounds: Normal heart sounds. No murmur heard. No friction rub. Pulmonary:      Breath sounds: Normal breath sounds. No wheezing or rales. Musculoskeletal:      Right lower leg: No edema. Left lower leg: No edema. Skin:     Findings: No rash. Neurological:      Mental Status: She is alert and oriented to person, place, and time. Psychiatric:         Mood and Affect: Mood normal.                  An electronic signature was used to authenticate this note.     --Alexander Best MD

## 2022-07-11 NOTE — PROGRESS NOTES
Attending Physician Statement  I have discussed the care of Dany Musa 40 y.o. female, including pertinent history and exam findings, with the resident Dr. Jonathon Hayes MD.    History and Exam:   Chief Complaint   Patient presents with    Hypertension     follow up no headaches     Arm Injury     right arm pain        Past Medical History:   Diagnosis Date    Abdominal pain     Bilateral carpal tunnel syndrome 11/7/2018    Fibroids     HTN (hypertension) 1/30/2015    Menometrorrhagia     Obesity (BMI 30.0-34.9) 5/26/2015     No Known Allergies   I have seen and examined the patient and the key elements of the encounter have been performed by me. BP Readings from Last 3 Encounters:   07/11/22 (!) 158/108   03/03/22 130/86   10/13/21 (!) 140/89     BP (!) 158/108 (Site: Right Upper Arm, Position: Sitting, Cuff Size: Medium Adult)   Pulse 75   Temp 97 °F (36.1 °C) (Temporal)   Ht 5' 2.99\" (1.6 m)   Wt 205 lb 12.8 oz (93.4 kg)   LMP 05/17/2015 Comment: blood hcg negative today  BMI 36.47 kg/m²   Lab Results   Component Value Date    WBC 10.9 11/20/2015    HGB 13.1 11/20/2015    HCT 40.1 11/20/2015     11/20/2015    CHOL 191 09/21/2021    TRIG 84 09/21/2021    HDL 65 09/21/2021    ALT 18 02/06/2015    AST 15 02/06/2015     09/21/2021    K 4.2 09/21/2021     09/21/2021    CREATININE 0.56 09/21/2021    BUN 14 09/21/2021    CO2 22 09/21/2021    TSH 1.43 09/21/2021    INR 0.9 08/12/2014    LABA1C 5.3 08/12/2014    LABMICR 14 11/20/2015     Lab Results   Component Value Date    LABALBU 3.8 02/06/2015     Lab Results   Component Value Date    IRON 47 11/20/2015    TIBC 338 11/20/2015    FERRITIN 73 11/20/2015     Lab Results   Component Value Date    LDLCHOLESTEROL 109 09/21/2021     I agree with the assessment, plan and the diagnosis of    Diagnosis Orders   1. Essential hypertension  amLODIPine (NORVASC) 10 MG tablet   2.  Bilateral carpal tunnel syndrome  acetaminophen (ACETAMINOPHEN EXTRA STRENGTH) 500 MG tablet    . I agree with orders as documented by the resident. More than 25 minutes spent  in face to face encounter with the patient and more than half in counseling. Patient's questions were answered. Patient Voiced understanding to the counseling. Return in about 1 month (around 8/11/2022) for HTN.    (GC Modifier)-Dr. Una Reveles MD

## 2022-07-11 NOTE — PROGRESS NOTES
HYPERTENSION visit     BP Readings from Last 3 Encounters:   03/03/22 130/86   10/13/21 (!) 140/89   09/21/21 (!) 153/107       LDL Cholesterol (mg/dL)   Date Value   09/21/2021 109     HDL (mg/dL)   Date Value   09/21/2021 65     BUN (mg/dL)   Date Value   09/21/2021 14     CREATININE (mg/dL)   Date Value   09/21/2021 0.56     Glucose (mg/dL)   Date Value   09/21/2021 83              Have you changed or started any medications since your last visit including any over-the-counter medicines, vitamins, or herbal medicines? no   Have you stopped taking any of your medications? Is so, why? -  no  Are you having any side effects from any of your medications? - no  How often do you miss doses of your medication? rare      Have you seen any other physician or provider since your last visit? yes - Ortho   Have you had any other diagnostic tests since your last visit? yes - XR   Have you been seen in the emergency room and/or had an admission in a hospital since we last saw you?  no   Have you had your routine dental cleaning in the past 6 months?  no     Do you have an active MyChart account? If no, what is the barrier?   No: Pending    Patient Care Team:  Adonis Ayala MD as PCP - General (Emergency Medicine)  Cristiane Bonilla DO as PCP - [facility]  No Bridges MD as Consulting Physician (Obstetrics & Gynecology)    Medical History Review  Past Medical, Family, and Social History reviewed and does contribute to the patient presenting condition    Health Maintenance   Topic Date Due    Varicella vaccine (1 of 2 - 2-dose childhood series) Never done    COVID-19 Vaccine (1) Never done    Pneumococcal 0-64 years Vaccine (2 - PCV) 02/20/2019    Depression Screen  05/24/2022    Flu vaccine (1) 09/01/2022    DTaP/Tdap/Td vaccine (2 - Td or Tdap) 03/17/2025    Lipids  09/21/2026    Hepatitis C screen  Completed    HIV screen  Completed    Hepatitis A vaccine  Aged Out    Hepatitis B vaccine  Aged Out    Hib vaccine  Aged Out    Meningococcal (ACWY) vaccine  Aged Out

## 2022-07-28 ENCOUNTER — HOSPITAL ENCOUNTER (OUTPATIENT)
Dept: PHYSICAL THERAPY | Facility: CLINIC | Age: 42
Setting detail: THERAPIES SERIES
Discharge: HOME | End: 2022-07-28
Payer: COMMERCIAL

## 2022-07-28 PROCEDURE — 97161 PT EVAL LOW COMPLEX 20 MIN: CPT

## 2022-07-28 NOTE — CONSULTS
[] [facility]) - [facility] &  Therapy  [address]  [phone]  F: [phone] [] [address]  [place] [address]   [address]  P: [phone]  F: [phone] [] [address] &  Therapy  [address]  P: [phone]  F: [phone] [x] [address]  P: [phone]  F: [phone] [] [address]  [facility]   [address]   [place]: [phone]  F: [phone]      Physical Therapy Upper Extremity Evaluation    Date:  2022  Patient: Peggy Cancer  : 1980  MRN: [id number]  Physician: Olivia Camejo MD  Insurance: [facility]: 3x week, for x3 weeks- 22-22  Medical Diagnosis:    X57.23DW (ICD-10-CM) - Sprain of unspecified part of right wrist and hand, initial encounter   S53.401A (ICD-10-CM) - Unspecified sprain of right elbow, initial encounter   S43.401A (ICD-10-CM) - Unspecified sprain of right shoulder joint, initial encounter   Rehab Codes: Sharamaylin Wyatt; C19.307N; S43.401A; M75.21; M75.4; M62.81  Onset Date: 22; see below   Next 's appt: week of 22    Subjective:   CC:    \"Pulled 200 plus books from batteries- 36 batteries- over 2 hours. \"  P and symptoms were instant and notified supervisor. Completed a incident report. Eventually visited nurse- after several days- and then [facility]. 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 \"   Denies previous injuries to R shoulder, wrist. Also denies through L shoulder. Issued ibuprofen- utilizes PRN- no relief. Also issued a second medication- not utilizing. Reports resolved R elbow pain.      HPI: 22; see above     PMHx: [x] Unremarkable [] Diabetes [x] HTN  [] Pacemaker   [] MI/Heart Problems [] Cancer [x] Arthritis [x] Other: L ACL reconstruction with rehab during high school. [x] Refer to full medical chart  In EPIC     Comorbidities: NONE  [] Obesity [] Dialysis  [] N/A   [] Asthma/COPD [] Dementia [] Other:   [] Stroke [] Sleep apnea [] Other:   [] Vascular disease [] Rheumatic disease [] Other:     Tests: [] X-Ray: [] MRI:  [] Other: none thus far to R shoulder or wrist.      Medications: [x] Refer to full medical record [] None [] Other: SEE ABOVE. Allergies:      [x] Refer to full medical record [] None [] Other:    Function:  Hand Dominance  [x] Right  [] Left  Employ[employer]    Job Status []  Normal duty   [x] Light duty   [] Off due to condition    []  Retired   [] Not employed   [] Disability  [] Other:  [x]  Return to work: no anticipated return to full duty date as of yet. Work activities/duties AG . Pain:  [x] Yes  [] No Location: R UT/posterior shoulder; R dorsal surface of wrist- \"so tight\"   Pain Rating: (0-10 scale) 6-8-9/10- over this past week   Pain altered Tx:  [] Yes  [x] No  Action: N/A  Symptoms:  [] Improving [] Worsening [x] Same- \"not better, but also not doing that job everyday. \"   Better:  [] AM    [] PM    [] Sit    [] Rise/Sit    []Stand    [] Walk    [] Lying    [x] Other: dons R wrist brace while at work; also hard brace for sleep; has not yet attempted HP/CP. Worse: [] AM    [] PM    [] Sit    [] Rise/Sit    []Stand    [] Walk    [] Lying    [] [place]                      [] Valsalva    [x] Other: \"any lifting; repetitive movements; sleeping; and just a daily pain. \"   Sleep: [] OK    [x] Disturbed    R hand digits N/T- constant- first three digits. Intermittent within L hand. Reports worse R hand  strength- denies dropping objects from either hand. Tender to touch of both areas. Reports swelling of R wrist at start- now resolved. Denies ecchymosis of both areas. Reports \"popping\" only, but no clicking or catching of R shoulder. Objective:     ROM  °A/P END FEEL STRENGTH TESTS (+/-) Left Right Not Tested    Left Right  Left Right Drop Arm - - []   Sit Shld Flex 160- A; P at end 160- A; P at end   4; P 4-; P Sulcus Sign   [x]   Sit Shld Abd 165- A; P at end  155- A; P at end  4; P 4-; P Apprehension   [x]   Sit Shld IR      Jades   [x]   Shoulder Flex      Speeds   [x]   Ext      Neer - Unable []   ABD      Jerry  - + []   ER @ 0 45 90 65- A/P @ 45 deg with P at end  55- A/P @ 45 deg with P at end  4+ 4; P Painful Arc - - []   IR 75- A/P @ 45 deg with P at end  70- A/P @ 45 deg with P at end   4+ 4; P Tinel   [x]   Supraspinatus      Empty can  - +; P only; no weakness    Infraspinatus      IR behind the back T10 L3; P    Serratus Ant      Lift off sign test   x         Braeden - +; P, near full ROM                Mid Trap            Lower Trap                                    Pronation WNL WNL  4+ 4; P       Supination WNL WNL  4+ 4; P       Wrist Flex. 70 60; P  4+ 4; P       Wrist Ext. 55 45; P  4+ 4; P       Radial Deviation WNL WNL  4+ 4; P       Ulnar Deviation WNL WNL  4+ 4; P        40, 30, 30- AV 45, 30, 30- AV              OBSERVATION No Deficit Deficit Not Tested Comments   Forward Head [] [x] []    Rounded Shoulders [] [x] []    Kyphosis [] [x] []    Scapular Height/Position [x] [] []    Winging [x] [] []    Scapulohumeral Rhythm [] [x] [] Limited d/t lack of R shoulder FLEX/ABD AROM. INSPECTION/PALPATION    Reports TTP to R UT/LS- with inc tissue tension. Also to R wrist extensors- but no inc tissue tension.     SC/AC Joint [] [] []    Supraspinatus [] [x] []    Biceps tendon/groove [] [] []    Posterior shld [] [x] []    Subscapularis [] [] []      Functional Test: UEFS Score: 34/80- 58% functionally impaired     Assessment:    Patient is a 40 y.o. pleasant female who presents to physical therapy initial evaluation with signs and symptoms consistent with potential potential R RTC tendinitis with impingement and concurrent R wrist extensor muscle strain d/t work injury. Patient demonstrates impairments and symptoms as detailed below in therapy goals. Patient currently limited in utilizing R UE for ADLs, sleeping secondary to these impairments and increased symptoms. Patient would benefit from continued physical therapy services in order to address these impairments and symptoms, and return to QOL, ADLs, work. Anticipated frequency detailed above. Prognosis limited secondary to  prolonged or chronic history of current condition; h/o potential B CTS; concurrent R shoulder with R wrist pain; also pain with most tests and measures today- justifying a low/moderate/high complexity evaluation. If unable to achieve significant improvements within six to nine visits, will consult referring physician and consider a follow-up visit with said physician. Patient verbalized understanding and agreement to all aforementioned statements. Patient would benefit from skilled physical therapy services in order to: inc R shoulder A/PROM; R wrist A/PROM; R shoulder/wrist gross strength; and overall improve tolerance for ADLs with R UE, sleeping, and work tasks. Problems:    [x] ? Pain:  [x] ? ROM:  [x] ? Strength:  [x] ? Function:  [] Other:      STG: (to be met in 6 treatments)  ? Pain: Patient will report < 6/10 pain at rest and < 8/10 pain with active movement in order to improve QOL. ? ROM: Will demo R shoulder AROM to match L and with < 5/10 P at end-ranges in order to improve functional mobility. Also R wrist to match L and with < 5/10 P at end-ranges. Will report dec incidence of R shoulder popping with P as well.   ? Strength: Will demo 4+/5 R shoulder gross strength, R wrist gross strength with < 5/10 P in order to better match L and improve light ADLs. ?  Function: Patient will report decreased TTP to R UT/LS/supra, wrist extensor in order to indicate decreased inflammation and improved healing of injured site.   Patient to be independent with home exercise program as demonstrated by performance with correct form without cues. LTG: (to be met in 12 treatments)  Will report < 5/10 P with sleeping positioning, and lifting of moderately heavy objects for safe return to work. Improve UEFS to < 58% impaired/limited               Patient goals: \"building my strength back up and to feel better in the areas that hurt. \"     Rehab Potential:  [x] Good  [x] Fair  [] Poor   Suggested Professional Referral:  [] No  [x] Yes: potential back to referring MD if no success with PT services. Barriers to Goal Achievement:  [] No  [x] Yes: prolonged or chronic history of current condition; h/o potential B CTS; concurrent R shoulder with R wrist pain; also pain with most tests and measures today. Domestic Concerns:  [x] No  [] Yes:    Pt. Education:  [x] Plans/Goals, Risks/Benefits discussed  [] Home exercise program  Method of Education: [x] Verbal  [] Demo  [] Written  Comprehension of Education:  [x] Verbalizes understanding. [] Demonstrates understanding. [] Needs Review. [] Demonstrates/verbalizes understanding of HEP/Ed previously given.     Treatment Plan:  [x] Therapeutic Exercise   45934  [] Iontophoresis: 4 mg/mL Dexamethasone Sodium Phosphate  mAmin  81059   [x] Therapeutic Activity  22622 [x] Vasopneumatic cold with compression  12186    [] Gait Training   07885 [] Ultrasound   30501   [] Neuromuscular Re-education  35758 [] Electrical Stimulation Unattended  44866   [x] Manual Therapy  94985 [] Electrical Stimulation Attended  29471   [x] Instruction in HEP  [] Lumbar/Cervical Traction  04873   [] Aquatic Therapy   19549 [x] Cold/hotpack    [] Massage   98147      [x] Dry Needling, 1 or 2 muscles  94837   [] Biofeedback, first 15 minutes   81134  [] Biofeedback, additional 15 minutes   12124 [x] Dry Needling, 3 or more muscles  34366     []  Medication allergies reviewed for use of    Dexamethasone Sodium Phosphate 4mg/ml     with iontophoresis treatments. Pt is not allergic. Frequency: 3 x/week for 9 visits    Todays Treatment:  Modalities: MHP to R UT/LS/posterior shoulder; vaso to finish; ice massage to R wrist extensors- begin next visit   Precautions: potential R RTC tendinitis with impingement and concurrent R wrist extensor muscle strain d/t work injury   Exercises:  Exercise Reps/ Time Weight/ Level Comments   Supine wand FLEX/ER/IR   NEXT         Seated scapular retractions    NEXT   Seated R wrist extensors stretch- gentle       Seated R wrist extensor eccentric strengthening  Light resistance  NEXT         Other:    Specific Instructions for next treatment: Begin with MHP per above; proceed with DTM R UT/LS, hypervolt to R posterior shoulder; R shoulder PROM- all motions; DTM R wrist extensors with PROM into FLEX/EXT; and finish with ice massage per above next visit. Issue HEP handout of interventions listed in grid above next visit as well.      Evaluation Complexity:  History (Personal factors, comorbidities) [] 0 [x] 1-2 [] 3+   Exam (limitations, restrictions) [] 1-2 [x] 3 [] 4+   Clinical presentation (progression) [x] Stable [] Evolving  [] Unstable   Decision Making [x] Low [] Moderate [] High    [x] Low Complexity [] Moderate Complexity [] High Complexity     Treatment Charges: Mins Units Time   [x] Evaluation       [x]  Low       []  Moderate       []  High 45 1 11:15-12PM   []  Modalities      []  Ther Exercise      []  Manual Therapy      []  Ther Activities      []  Aquatics      []  Vasocompression      []  Other        TOTAL TREATMENT TIME: 45    Time in: 11:15AM   Time Out: 12PM    Electronically signed by: Edouard Neff PT

## 2022-08-01 ENCOUNTER — HOSPITAL ENCOUNTER (OUTPATIENT)
Dept: PHYSICAL THERAPY | Facility: CLINIC | Age: 42
Setting detail: THERAPIES SERIES
Discharge: HOME | End: 2022-08-01
Payer: COMMERCIAL

## 2022-08-01 PROCEDURE — 97140 MANUAL THERAPY 1/> REGIONS: CPT

## 2022-08-01 PROCEDURE — 97110 THERAPEUTIC EXERCISES: CPT

## 2022-08-01 NOTE — FLOWSHEET NOTE
[] [facility]  [address]    [phone]  F: [phone]   [] [address]  [facility]   [address]  P: [phone]  F: [phone]  [] [address]  P: [phone]  F: [phone]   [x] [address]  P: [phone]  F: [phone]  [] [facility]  [address]  [place]: [phone]   F: [phone] [] [address] [facility] [address] [facility] [address]   [place]: [phone]  F: [phone]       Date:  2022  Patient Name:  Deborah Pritchett    :  1980  MRN: [id number]  Physician: Bren Kendrick MD                     Insurance: [facility]: 3x week, for x3 weeks- 22-22  Medical Diagnosis:     S57.63FO (ICD-10-CM) - Sprain of unspecified part of right wrist and hand, initial encounter   S53.401A (ICD-10-CM) - Unspecified sprain of right elbow, initial encounter   S43.401A (ICD-10-CM) - Unspecified sprain of right shoulder joint, initial encounter   Rehab Codes: Bassem Bey; S53.401A; S43.401A; M75.21; M75.4; M62.81  Onset Date: 22; see below                   Next 's appt: week of 8/15/22  Visit# / total visits: 2/9;     Cancels/No Shows: 0/0    Subjective:    Pain:  [x] Yes  [] No Location: R wrist, hand, shoulder Pain Rating: (0-10 scale) R Shoulder 6/10  Pain altered Tx:  [] No  [x] Yes  Action: focused on manual, HP and PROM  Comments: Patient arrives stating that the wrist feels OK today and that the shoulder had some pain. States that she did not work today so her symptoms were a bit lower.      Objective:  Modalities: MHP to R UT/posterior shoulder in sitting for 10 minutes at beginning of session    NEXT: vaso to finish; ice massage to R wrist extensors  Precautions: potential R RTC tendinitis with impingement and concurrent R wrist extensor muscle strain d/t work injury   Exercises:  Exercise Reps/ Time Weight/ Level Comments         Manual  20'  PROM shoulder in all directions - 8'  Hypervolt to R posterior shoulder/UT - 7'  STM to R wrist extensors/extensor indicis  5 '         Supine wand flex/er/ER 2x10 1lb 8/1   Seated scap retractions 2x10x3\"  8/1   TB Rows/Ext 2x10 green 8/1                               Seated R wrist extensors stretch- gentle  3x30\"   8/1   Seated R wrist extensor eccentric strengthening  15x 1 lb 8/1    Seated R pro/sup 2x10 1 lb  8/1   Other:  Manual:     Specific Instructions for next treatment: proceed with DTM R UT/LS, hypervolt to R posterior shoulder; DTM R wrist extensors with PROM into FLEX/EXT; and finish with ice massage per above next visit. ndout of interventions listed in grid above next visit as well. Shoulder isometrics, address wrist strength deficits and pain      Treatment Charges: Mins Units Time in/Out   []  Modalities      [x]  Ther Exercise 18 1 4:32pm - 4:41pm  4:49pm - 4:58pm   [x]  Manual Therapy 23 2 4:16pm-4:32pm  4:41pm-4:48pm   []  Ther Activities      []  Aquatics      []  Vasocompression      [x]  Other: HP  10 N/c 4:06pm - 4:16pm   Total Treatment time 41 3 4:06pm-4:58pm       Assessment: [x] Progressing toward goals. Initiated session with HP to R shoulder in sitting followed by manual to R shoulder and wrist extensors. Continued with manual of R shoulder and PROM of R shoulder in all directions with patient tolerating well and demonstrating adequate range. Continued with TB rows and seated scap retractions with patient requiring minimal cues for decreasing compensatory muscle usage. Continued with manual of wrist extensors followed by stretching and gentle strengthening of wrist extensors.  Patient reports some difficulty with wrist extension exercise but able to complete all reps with proper form. Updated HEP at end of session. Patient reports improvement of symptoms following shoulder manual.    [] No change. [] Other:  [x] Patient would continue to benefit from skilled physical therapy services in order to: inc R shoulder A/PROM; R wrist A/PROM; R shoulder/wrist gross strength; and overall improve tolerance for ADLs with R UE, sleeping, and work tasks. STG/LTG:  STG: (to be met in 6 treatments)  ? Pain: Patient will report < 6/10 pain at rest and < 8/10 pain with active movement in order to improve QOL. ? ROM: Will demo R shoulder AROM to match L and with < 5/10 P at end-ranges in order to improve functional mobility. Also R wrist to match L and with < 5/10 P at end-ranges. Will report dec incidence of R shoulder popping with P as well.   ? Strength: Will demo 4+/5 R shoulder gross strength, R wrist gross strength with < 5/10 P in order to better match L and improve light ADLs. ? Function: Patient will report decreased TTP to R UT/LS/supra, wrist extensor in order to indicate decreased inflammation and improved healing of injured site. Patient to be independent with home exercise program as demonstrated by performance with correct form without cues. LTG: (to be met in 12 treatments)  Will report < 5/10 P with sleeping positioning, and lifting of moderately heavy objects for safe return to work. Improve UEFS to < 58% impaired/limited               Patient goals: \"building my strength back up and to feel better in the areas that hurt. \"    Pt. Education:  [x] Yes  [] No  [] Reviewed Prior HEP/Ed  Access Code: [email or web address]  URL: [email or web address]  Date: 08/01/2022  Prepared by: Calli Scheuermann    Exercises  Seated Scapular Retraction - 1 x daily - 7 x weekly - 2 sets - 10 reps - 3 second hold  Supine Shoulder Flexion Extension AAROM with Dowel - 1 x daily - 7 x weekly - 2 sets - 10 reps  Seated Wrist Flexion with Overpressure - 1 x daily - 7 x weekly - 1 sets - 3 reps - 30s hold  Supine Shoulder External Rotation with Dowel - 1 x daily - 7 x weekly - 2 sets - 10 reps      Method of Education: [x] Verbal  [x] Demo  [x] Written  Comprehension of Education:  [x] Verbalizes understanding. [x] Demonstrates understanding. [] Needs review. [] Demonstrates/verbalizes HEP/Ed previously given. Plan: [x] Continue current frequency toward long and short term goals.     [x] Specific Instructions for subsequent treatments: see above      Time In:4:04 pm            Time Out: 4:58pm    Electronically signed by:  Jw Medel PT

## 2022-08-01 NOTE — PROGRESS NOTES
Attending Physician Statement    Wt Readings from Last 3 Encounters:   10/13/21 201 lb 9.6 oz (91.4 kg)   09/21/21 197 lb (89.4 kg)   05/24/21 195 lb 6.4 oz (88.6 kg)     Temp Readings from Last 3 Encounters:   12/21/20 97.7 °F (36.5 °C) (Temporal)   12/07/20 97 °F (36.1 °C) (Temporal)   09/10/20 97.2 °F (36.2 °C)     BP Readings from Last 3 Encounters:   10/13/21 (!) 140/89   09/21/21 (!) 153/107   05/24/21 (!) 137/100     Pulse Readings from Last 3 Encounters:   10/13/21 66   09/21/21 72   05/24/21 81         I have discussed the care of Raf Quintero, including pertinent history and exam findings with the resident. I have reviewed the key elements of all parts of the encounter with the resident. I agree with the assessment, plan and orders as documented by the resident.   (GE Modifier) Posterior Auricular Interpolation Flap Division And Inset Text: Division and inset of the posterior auricular interpolation flap was performed to achieve optimal aesthetic result, restore normal anatomic appearance and avoid distortion of normal anatomy, expedite and facilitate wound healing, achieve optimal functional result and because linear closure either not possible or would produce suboptimal result. The patient was prepped and draped in the usual manner. The pedicle was infiltrated with local anesthesia. The pedicle was sectioned with a #15 blade. The pedicle was de-bulked and trimmed to match the shape of the defect. Hemostasis was achieved. The flap donor site and free margin of the flap were secured with deep buried sutures and the wound edges were re-approximated.

## 2022-08-04 ENCOUNTER — HOSPITAL ENCOUNTER (OUTPATIENT)
Dept: PHYSICAL THERAPY | Facility: CLINIC | Age: 42
Setting detail: THERAPIES SERIES
Discharge: HOME | End: 2022-08-04
Payer: COMMERCIAL

## 2022-08-04 PROCEDURE — 97110 THERAPEUTIC EXERCISES: CPT

## 2022-08-04 PROCEDURE — 97140 MANUAL THERAPY 1/> REGIONS: CPT

## 2022-08-04 NOTE — FLOWSHEET NOTE
[] [address]  [address]    [phone]  F: [phone]   [] [address]  [facility] 36   [address]  P: [phone]  F: [phone]  [] [address]  [address] [facility]  P: [phone]  F: [phone]   [x] [address]  P: [phone]  F: [phone]  [] [facility]  [address] [facility] [address]  [place]: [phone]   F: [phone] [] [address] [facility] [address]   [place]: [phone]  F: [phone]       Date:  2022  Patient Name:  Jackson Rae    :  1980  MRN: [id number]  Physician: Ron Real MD                     Insurance: [address] [place] [address]: 3x week, for x3 weeks- 22-22  Medical Diagnosis:     D71.08HF (ICD-10-CM) - Sprain of unspecified part of right wrist and hand, initial encounter   S53.401A (ICD-10-CM) - Unspecified sprain of right elbow, initial encounter   S43.401A (ICD-10-CM) - Unspecified sprain of right shoulder joint, initial encounter   Rehab Codes: Ora Blakely; S53.401A; S43.401A; M75.21; M75.4; M62.81  Onset Date: 22; see below                   Next 's appt: week of 8/15/22  Visit# / total visits: 3/9;     Cancels/No Shows: 0/0    Subjective:    Pain:  [x] Yes  [] No Location: R wrist, hand, shoulder Pain Rating: (0-10 scale) R Shoulder 7/10  Pain altered Tx:  [] No  [x] Yes  Action: focused on manual, HP and PROM  Comments: Patient reported slight increase in pain level from last visit. Stated that she was feeling some soreness located along the entire R UE.     Objective:  Modalities: MHP to R UT/posterior shoulder in sitting for 10 minutes at beginning of session    NEXT: vaso to finish; ice massage to R wrist extensors  Precautions: potential R RTC tendinitis with impingement and concurrent R wrist extensor muscle strain d/t work injury   Exercises:  Exercise Reps/ Time Weight/ Level Comments         Manual  20'  PROM shoulder in all directions - 8'  Hypervolt to R posterior shoulder/UT - 7'  STM to R wrist extensors/extensor indicis  5 '         Supine wand flex/ER 2x10 1lb    Seated scap retractions 2x10x3\"     TB Rows/Ext 2x10 green                               Seated R wrist extensors stretch- gentle  3x30\"      Seated R wrist extensor eccentric strengthening  2x10 1 lb     Seated R pro/sup 2x10 1 lb    Other:  Manual:     Specific Instructions for next treatment: proceed with DTM R UT/LS, hypervolt to R posterior shoulder; DTM R wrist extensors with PROM into FLEX/EXT; and finish with ice massage per above next visit. ndout of interventions listed in grid above next visit as well. Shoulder isometrics, address wrist strength deficits and pain      Treatment Charges: Mins Units Time in/Out   []  Modalities      [x]  Ther Exercise 17 1 [id number]   [x]  Manual Therapy 23 2 0204-9074   []  Ther Activities      []  Aquatics      []  Vasocompression      [x]  Other: HP  10 N/c [id number]   Total Treatment time 40 1 2777-6011       Assessment: [x] Progressing toward goals. Continued with activities as last session due to increased pain level. Focus on ROM and decrease muscle tension. Pt declined vasocompression and ice massage. [] No change. [] Other:  [x] Patient would continue to benefit from skilled physical therapy services in order to: inc R shoulder A/PROM; R wrist A/PROM; R shoulder/wrist gross strength; and overall improve tolerance for ADLs with R UE, sleeping, and work tasks. STG/LTG:  STG: (to be met in 6 treatments)  ? Pain: Patient will report < 6/10 pain at rest and < 8/10 pain with active movement in order to improve QOL. ?  ROM: Will demo R shoulder AROM to match L and with < 5/10 P at end-ranges in order to improve functional mobility. Also R wrist to match L and with < 5/10 P at end-ranges. Will report dec incidence of R shoulder popping with P as well.   ? Strength: Will demo 4+/5 R shoulder gross strength, R wrist gross strength with < 5/10 P in order to better match L and improve light ADLs. ? Function: Patient will report decreased TTP to R UT/LS/supra, wrist extensor in order to indicate decreased inflammation and improved healing of injured site. Patient to be independent with home exercise program as demonstrated by performance with correct form without cues. LTG: (to be met in 12 treatments)  Will report < 5/10 P with sleeping positioning, and lifting of moderately heavy objects for safe return to work. Improve UEFS to < 58% impaired/limited               Patient goals: \"building my strength back up and to feel better in the areas that hurt. \"    Pt. Education:  [x] Yes  [] No  [] Reviewed Prior HEP/Ed  Access Code: [email or web address]  URL: [email or web address]  Date: 08/01/2022  Prepared by: Calli Scheuermann    Exercises  Seated Scapular Retraction - 1 x daily - 7 x weekly - 2 sets - 10 reps - 3 second hold  Supine Shoulder Flexion Extension AAROM with Dowel - 1 x daily - 7 x weekly - 2 sets - 10 reps  Seated Wrist Flexion with Overpressure - 1 x daily - 7 x weekly - 1 sets - 3 reps - 30s hold  Supine Shoulder External Rotation with Dowel - 1 x daily - 7 x weekly - 2 sets - 10 reps      Method of Education: [x] Verbal  [x] Demo  [x] Written  Comprehension of Education:  [x] Verbalizes understanding. [x] Demonstrates understanding. [] Needs review. [] Demonstrates/verbalizes HEP/Ed previously given. Plan: [x] Continue current frequency toward long and short term goals.     [x] Specific Instructions for subsequent treatments: see above      Time In: 9228            Time Out: 5518    Electronically signed by:  Jazzy Milton PTA

## 2022-08-05 ENCOUNTER — HOSPITAL ENCOUNTER (OUTPATIENT)
Dept: PHYSICAL THERAPY | Facility: CLINIC | Age: 42
Setting detail: THERAPIES SERIES
Discharge: HOME | End: 2022-08-05
Payer: COMMERCIAL

## 2022-08-05 NOTE — FLOWSHEET NOTE
[] [address]  [address]    [phone]  F: [phone]   [] [address]  [facility] 36   [address]  P: [phone]  F: [phone]  [] [address]  [address] [facility] [address]  P: [phone]  F: [phone] [] [address]  P: [phone]  F: [phone]  [] [address] [facility]   [address]   [place]: [phone]  F: [phone]   [] [address]  [address] [facility] [address]  [place]: [phone]   F: [phone]     Physical Therapy Cancel/No Show note    Date: 2022  Patient: Geneva Martin  : 1980  MRN: [id number]    Cancels/No Shows to date:     For today's appointment patient:    [x]  Cancelled    [] Rescheduled appointment    [] No-show     Reason given by patient:    []  Patient ill    [x]  Conflicting appointment    [] No transportation      [] Conflict with work    [] No reason given    [] Weather related    [] COVID-19    [x] Other:      Comments:  Confirmed next appt for Monday      [] Next appointment was confirmed    Electronically signed by: Minna Kelley PT

## 2022-08-08 ENCOUNTER — HOSPITAL ENCOUNTER (OUTPATIENT)
Dept: PHYSICAL THERAPY | Facility: CLINIC | Age: 42
Setting detail: THERAPIES SERIES
Discharge: HOME | End: 2022-08-08
Payer: COMMERCIAL

## 2022-08-08 PROCEDURE — 97140 MANUAL THERAPY 1/> REGIONS: CPT

## 2022-08-08 PROCEDURE — 97110 THERAPEUTIC EXERCISES: CPT

## 2022-08-08 NOTE — FLOWSHEET NOTE
[] [address]  [address]    [phone]  F: [phone]   [] [address]  [facility] 36   [address]  P: [phone]  F: [phone]  [] [address] [facility]  Therapy  [address] [place] [address]  P: [phone]  F: [phone]   [x] [address]  P: [phone]  F: [phone]  [] [facility]  [address] [facility] [address]  [place]: [phone]   F: [phone] [] [facility]  Outpatient Rehabilitation &  Therapy  [facility] [address]   [place]: [phone]  F: [phone]     Date:  2022  Patient Name:  Osmin Cabrera    :  1980  MRN: [id number]  Physician: Abran Harden MD                     Insurance: [address] [place] [address]: 3x week, for x3 weeks- 22-22  Medical Diagnosis:     F86.15GX (ICD-10-CM) - Sprain of unspecified part of right wrist and hand, initial encounter   S53.401A (ICD-10-CM) - Unspecified sprain of right elbow, initial encounter   S43.401A (ICD-10-CM) - Unspecified sprain of right shoulder joint, initial encounter   Rehab Codes: Amy Hardy; F40.741W; S43.401A; M75.21; M75.4; M62.81  Onset Date: 22; see below                   Next 's appt: TBD- 22  Visit# / total visits: 4/9    Cancels/No Shows: 1/0    Subjective:    Pain:  [x] Yes  [] No Location: R wrist, hand, shoulder Pain Rating: (0-10 scale) R Shoulder 5/10  Pain altered Tx:  [] No  [x] Yes  Action: focused on manual, HP and PROM  Comments: \"Getting my strength back- I don't know about improving- feel like it's gonna be a life long thing. \" Good compliance, tolerance to current HEP interventions thus far. Reports performing CP to R shoulder and wrist prior to each work shift with relief.  Has not yet scheduled follow-up with [facility] health- despite previous conversation with primary PT during last visit. Objective:  Modalities: MHP to R UT/posterior shoulder in supine for x8 minutes at start; vaso to finish + ice massage to R wrist extensors- DECLINED to perform ice at home   Precautions: potential R RTC tendinitis with impingement and concurrent R wrist extensor muscle strain d/t work injury   Exercises: current light duty   Exercise Reps/ Time Weight/ Level Comments   Supine wand flex/ER/IR 2x10 1lb To 2#- 8/8/22; post MT  TCs with ER/IR         Seated scap retractions 2x10x3\"     TB Rows/Ext 2x10 Lime  RESUME NEXT         Seated R wrist extensors stretch- gentle OP 3x30\" ea      Seated R wrist extensor eccentric strengthening 2x10 1lb Focus upon slow lower against gravity    Seated R pro/sup 2x10 1lb    Other:    Manual: PROM- all motions- to R shoulder post MHP; hypervolt to R posterior shoulder/cuff with bullet attachment on lowest setting; and then finally, DTM R wrist extensors- PROM into FLEX/EXT- x20' total.    R shoulder PROM:  8/8/22- FLEX- 165 deg- P; SCAPT- 170 deg- P; limited with all d/t pt retracting from PT, though does not verbalize P ratings nor request breaks- empt end-feel      Specific Instructions for next treatment: Follow-up regarding communication with [facility]. continue MHP, PROM to R shoulder, hypervolt to R posterior shoulder, DTM R wrist extensors with PROM into FLEX/EXT next visit. Treatment Charges: Mins Units Time in/Out   [x]  Modalities- MHP 8 - 12:03-12:11PM   [x]  Ther Exercise 19 1 12:31-12:50PM   [x]  Manual Therapy 20 2 12:11-12:31PM   []  Ther Activities      []  Aquatics      []  Vasocompression      []  Other: Total Treatment time 47  12:03-12:50PM     Assessment: [] Progressing toward goals. [] No change. [x] Other: Pt presents with dec pain compared with last visit, however, MOD and reports inc strength, but no other improvements.  Pt concerned she will be dealing with these issues for life. Also reports not yet scheduling a follow-up with [facility]- emphasized as [address] [facility] C9 for PT services will end next Thursday. Proceeded with combination of passive and active treatments- demos improved R shoulder FLEX/SCAPT PROM- however- continues to retract from PT in pain and will not provide pain ratings. Emphasized to pt to not push through pain- will deter healing. Will follow-up regarding communication with [facility] health next visit. [x] Patient would continue to benefit from skilled physical therapy services in order to: inc R shoulder A/PROM; R wrist A/PROM; R shoulder/wrist gross strength; and overall improve tolerance for ADLs with R UE, sleeping, and work tasks. STG: (to be met in 6 treatments)  ? Pain: Patient will report < 6/10 pain at rest and < 8/10 pain with active movement in order to improve QOL. ? ROM: Will demo R shoulder AROM to match L and with < 5/10 P at end-ranges in order to improve functional mobility. Also R wrist to match L and with < 5/10 P at end-ranges. Will report dec incidence of R shoulder popping with P as well.   ? Strength: Will demo 4+/5 R shoulder gross strength, R wrist gross strength with < 5/10 P in order to better match L and improve light ADLs. ? Function: Patient will report decreased TTP to R UT/LS/supra, wrist extensor in order to indicate decreased inflammation and improved healing of injured site. Patient to be independent with home exercise program as demonstrated by performance with correct form without cues. LTG: (to be met in 12 treatments)  Will report < 5/10 P with sleeping positioning, and lifting of moderately heavy objects for safe return to work. Improve UEFS to < 58% impaired/limited               Patient goals: \"building my strength back up and to feel better in the areas that hurt. \"    Pt. Education:  [x] Yes  [] No  [x] Reviewed Prior HEP/Ed  Access Code: [email or web address]  URL: [email or web address]  Date: 08/01/2022  Prepared by: Calli Scheuermann    Exercises  Seated Scapular Retraction - 1 x daily - 7 x weekly - 2 sets - 10 reps - 3 second hold  Supine Shoulder Flexion Extension AAROM with Dowel - 1 x daily - 7 x weekly - 2 sets - 10 reps  Seated Wrist Flexion with Overpressure - 1 x daily - 7 x weekly - 1 sets - 3 reps - 30s hold  Supine Shoulder External Rotation with Dowel - 1 x daily - 7 x weekly - 2 sets - 10 reps    Method of Education: [] Verbal  [] Demo  [] Written  Comprehension of Education:  [] Verbalizes understanding. [] Demonstrates understanding. [] Needs review. [x] Demonstrates/verbalizes HEP/Ed previously given. Plan: [x] Continue current frequency toward long and short term goals. [x] Specific Instructions for subsequent treatments: Follow-up regarding communication with [facility] health. continue MHP, PROM to R shoulder, hypervolt to R posterior shoulder, DTM R wrist extensors with PROM into FLEX/EXT next visit.       Time In: 12:03PM      Time Out: 12:50PM    Electronically signed by:  Edouard Neff PT

## 2022-08-11 ENCOUNTER — HOSPITAL ENCOUNTER (OUTPATIENT)
Dept: PHYSICAL THERAPY | Facility: CLINIC | Age: 42
Setting detail: THERAPIES SERIES
Discharge: HOME | End: 2022-08-11
Payer: COMMERCIAL

## 2022-08-11 PROCEDURE — 97140 MANUAL THERAPY 1/> REGIONS: CPT

## 2022-08-11 PROCEDURE — 97110 THERAPEUTIC EXERCISES: CPT

## 2022-08-11 NOTE — FLOWSHEET NOTE
home   Precautions: potential R RTC tendinitis with impingement and concurrent R wrist extensor muscle strain d/t work injury   Exercises: current light duty   Exercise Reps/ Time Weight/ Level Comments   Goal update, UEFS, HEP/PT POC education    COMPLETED- 8/11/22  D/C passive wrist extensor stretch for home secondary to inc P; unclear improvements with PT services thus far- may benefit from imaging if deemed appropriate by [facility] and approved through [address] [place] [address]; but will finish remaining PT visits- verbalized understanding to all    Supine wand flex/ER/IR 2x10 1lb To 2#- 8/8/22; post MT  TCs with ER/IR         Seated scap retractions 2x10x3\"     TB Rows/Ext 2x10 Lime  RESUME NEXT         Seated R wrist extensors stretch- gentle OP 3x30\" ea   Held final rep secondary to inc P- instructed to D/C for home- 8/11/22    Seated R wrist extensor eccentric strengthening 2x10 1lb Focus upon slow lower against gravity    Seated R pro/sup 2x10 1lb    Other: BOLD is completed. See below for goal update. UEFS is 44%     Manual: PROM- all motions- to R shoulder post MHP; hypervolt to R posterior shoulder/cuff with bullet attachment on lowest setting; and then finally, DTM R wrist extensors- PROM into FLEX/EXT- x20' total. Only hypervolt- 8/11/22    R shoulder PROM:  8/8/22- FLEX- 165 deg- P; SCAPT- 170 deg- P; limited with all d/t pt retracting from PT, though does not verbalize P ratings nor request breaks- empt end-feel      Specific Instructions for next treatment: Continue MHP, PROM to R shoulder, hypervolt to R posterior shoulder, DTM R wrist extensors with PROM into FLEX/EXT, and light exercises for remainder of PT POC secondary to pending follow-up with [facility] and further imaging.     Treatment Charges: Mins Units Time in/Out   [x]  Modalities- MHP 8 - 1:03-1:11PM   [x]  Ther Exercise 32 2 1:25-1:57PM   [x]  Manual Therapy 15 1 1:11-1:25PM   []  Ther Activities      []  Aquatics      []  Vasocompression      [] Other:      Total Treatment time 55 3 1:03-1:57PM     Assessment: [] Progressing toward goals. [] No change. [x] Other: Pt presents with continued, MOD P ratings for both R shoulder and wrist, and unclear benefits with PT services thus far. Therefore, performed goal update and re-assessed UEFS for PN to referring MD- results as detailed above and below- improved R shoulder PROM, ER/IR AROM, and FLEX/IR strength- however- continued difficulty positioning for sleeping and 6-9/10 pain over this past week. Also continues to demo R shoulder ABD/ER weakness with P. Unable to progress active interventions in clinic secondary to continued, high pain ratings. Pt educated as follows: D/C passive wrist extensor stretch for home secondary to inc P; unclear improvements with PT services thus far- may benefit from imaging if deemed appropriate by [facility] and approved through [facility]; but will finish remaining PT visits- verbalized understanding to all. [x] Patient would continue to benefit from skilled physical therapy services in order to: inc R shoulder A/PROM; R wrist A/PROM; R shoulder/wrist gross strength; and overall improve tolerance for ADLs with R UE, sleeping, and work tasks. STG: (to be met in 6 treatments)  ? Pain: Patient will report < 6/10 pain at rest and < 8/10 pain with active movement in order to improve QOL.- NOT MET- 5-8/10 over this past week   ? ROM: Will demo R shoulder AROM to match L and with < 5/10 P at end-ranges in order to improve functional mobility. Also R wrist to match L and with < 5/10 P at end-ranges. Will report dec incidence of R shoulder popping with P as well.- NOT MET- reports same frequency of R shoulder popping- every day- but intermittent with P; 150 deg- ABD, 155 deg- FLEX- both with P and stiffness at end of each motion; 75 deg- A/PROM- of ER/IR without P at end of each motion   ? Strength:  Will demo 4+/5 R shoulder gross strength, R wrist gross strength with < 5/10 P in order to better match L and improve light ADLs. - PROGRESSING- 4/5 ER with P, 4+/5 IR without P, 4/5 ABD with P, 4/5 FLEX without P  ? Function: Patient will report decreased TTP to R UT/LS/supra, wrist extensor in order to indicate decreased inflammation and improved healing of injured site. - MET  Patient to be independent with home exercise program as demonstrated by performance with correct form without cues. - UNCLEAR  LTG: (to be met in 12 treatments)  Will report < 5/10 P with sleeping positioning, and lifting of moderately heavy objects for safe return to work. - NOT MET- continued, sig difficulty with sleeping positioning- unable to sleep upon either side    Improve UEFS to < 58% impaired/limited- MET- 44%- NEW GOAL: < 40%               Patient goals: \"building my strength back up and to feel better in the areas that hurt. \"- UNCLEAR    Pt. Education:  [x] Yes  [] No  [] Reviewed Prior HEP/Ed  Access Code: [email or web address]  URL: [email or web address]/  Date: 08/01/2022  Prepared by: Calli Scheuermann    Exercises  Seated Scapular Retraction - 1 x daily - 7 x weekly - 2 sets - 10 reps - 3 second hold  Supine Shoulder Flexion Extension AAROM with Dowel - 1 x daily - 7 x weekly - 2 sets - 10 reps  Seated Wrist Flexion with Overpressure - 1 x daily - 7 x weekly - 1 sets - 3 reps - 30s hold  Supine Shoulder External Rotation with Dowel - 1 x daily - 7 x weekly - 2 sets - 10 reps    Method of Education: [x] Verbal  [] Demo  [] Written  Comprehension of Education:  [x] Verbalizes understanding. [] Demonstrates understanding. [] Needs review. [] Demonstrates/verbalizes HEP/Ed previously given. 8/11/2022- See grid above for details. Plan: [x] Continue current frequency toward long and short term goals.     [x] Specific Instructions for subsequent treatments: Continue MHP, PROM to R shoulder, hypervolt to R posterior shoulder, DTM R wrist extensors with PROM into FLEX/EXT, and light active exercises for remainder of PT POC secondary to pending follow-up with [facility] and further imaging.       Time In: 1:03PM     Time Out: 1:58PM    Electronically signed by:  Moses Claros PT

## 2022-08-11 NOTE — PROGRESS NOTES
difficulty with sleeping positioning- unable to sleep upon either side    Improve UEFS to < 58% impaired/limited- MET- 44%- NEW GOAL: < 40%               Patient goals: \"building my strength back up and to feel better in the areas that hurt. \"- UNCLEAR    Treatment Plan:  [x] Therapeutic Exercise   30780  [] Iontophoresis: 4 mg/mL Dexamethasone Sodium Phosphate  mAmin  86021   [x] Therapeutic Activity  11833 [x] Vasopneumatic cold with compression  94404    [] Gait Training   63617 [] Ultrasound   08346   [] Neuromuscular Re-education  24707 [] Electrical Stimulation Unattended  65172   [x] Manual Therapy  40606 [] Electrical Stimulation Attended  34364   [x] Instruction in HEP  [] Lumbar/Cervical Traction  40824   [] Aquatic Therapy   74775 [x] Cold/hotpack    [] Massage   72926      [x] Dry Needling, 1 or 2 muscles  77863   [] Biofeedback, first 15 minutes   50578  [] Biofeedback, additional 15 minutes   57826 [x] Dry Needling, 3 or more muscles  52233     Patient Status:     [] Continue per initial plan of care. [] Additional visits necessary. [x] Other: see above- pt to finish current [facility] C[facility] for PT services, but then will be D/C secondary to unclear benefits thus far. Requested Frequency/Duration: 3 times per week for 9 treatments [total]. Electronically signed by Aysha Khoury PT on 8/11/2022 at 2:32 PM    If you have any questions or concerns, please don't hesitate to call.   Thank you for your referral.

## 2022-08-12 ENCOUNTER — HOSPITAL ENCOUNTER (OUTPATIENT)
Dept: PHYSICAL THERAPY | Facility: CLINIC | Age: 42
Setting detail: THERAPIES SERIES
Discharge: HOME | End: 2022-08-12
Payer: COMMERCIAL

## 2022-08-12 PROCEDURE — 97110 THERAPEUTIC EXERCISES: CPT

## 2022-08-12 PROCEDURE — 97140 MANUAL THERAPY 1/> REGIONS: CPT

## 2022-08-12 NOTE — FLOWSHEET NOTE
[] [facility]  [address]    [phone]  F: [phone]   [] [address]  [facility]   [address]  P: [phone]  F: [phone]  [] [address]  P: [phone]  F: [phone]   [x] [address]  P: [phone]  F: [phone]  [] [facility]  [address]  [place]: [phone]   F: [phone] [] [address]  [facility] [address]   [place]: [phone]  F: [phone]     Date:  2022  Patient Name:  Maral Schilling    :  1980  MRN: [id number]  Physician: Lula Daniels MD                     Insurance: [facility]: 3x week, for x3 weeks- 22-22  Medical Diagnosis:     T81.58ZW (ICD-10-CM) - Sprain of unspecified part of right wrist and hand, initial encounter   S53.401A (ICD-10-CM) - Unspecified sprain of right elbow, initial encounter   S43.401A (ICD-10-CM) - Unspecified sprain of right shoulder joint, initial encounter   Rehab Codes: Maximiliano Indiana; S53.401A; S43.401A; M75.21; M75.4; M62.81  Onset Date: 22; see below                   Next 's appt: 22- [facility]   Visit# / total visits:     Cancels/No Shows: 1/0    Subjective:    Pain:  [x] Yes  [] No Location: R wrist, hand, shoulder Pain Rating: (0-10 scale) R Shoulder 5/10  Pain altered Tx:  [] No  [x] Yes  Action: focused on manual, HP and PROM  Comments: Pt c/o 5/10 R shoulder and wrist pain upon arrival, no significant changes since beginning PT.      Objective:  Modalities: MHP to R UT/posterior shoulder in supine for x8 minutes at start; vaso to finish + ice massage to R wrist extensors- DECLINED to perform ice at home Precautions: potential R RTC tendinitis with impingement and concurrent R wrist extensor muscle strain d/t work injury   Exercises: current light duty   Exercise Reps/ Time Weight/ Level Comments    Goal update, UEFS, HEP/PT POC education    COMPLETED- 8/11/22  D/C passive wrist extensor stretch for home secondary to inc P; unclear improvements with PT services thus far- may benefit from imaging if deemed appropriate by [facility] and approved through [facility]; but will finish remaining PT visits- verbalized understanding to all  -   Supine wand flex/ER/IR 2x10 1lb To 2#- 8/8/22; post MT  TCs with ER/IR -   PROM  10'  All directions- pain at end ranges x   Sidelying        ER 2x10   x   ABD 2x10   x   HAB 2x10   x          Seated scap retractions 2x10   x   TB Rows 2x10 Lime   x          Seated R wrist extensor eccentric strengthening 2x10 1lb Focus upon slow lower against gravity  x   Seated R pro/sup 2x10 1lb  -   Other: BOLD is completed. See below for goal update. UEFS is 44%     Manual: PROM- all motions- to R shoulder post MHP; hypervolt to R posterior shoulder/cuff with bullet attachment on lowest setting; and then finally, DTM R wrist extensors- PROM into FLEX/EXT- x20' total. Only hypervolt- 8/11/22    R shoulder PROM:  8/8/22- FLEX- 165 deg- P; SCAPT- 170 deg- P; limited with all d/t pt retracting from PT, though does not verbalize P ratings nor request breaks- empt end-feel      Specific Instructions for next treatment: Continue MHP, PROM to R shoulder, hypervolt to R posterior shoulder, DTM R wrist extensors with PROM into FLEX/EXT, and light exercises for remainder of PT POC secondary to pending follow-up with [facility] and further imaging. Treatment Charges: Mins Units Time in/Out   [x]  Modalities- MHP 10 - 1:10pm-1:20pm   [x]  Ther Exercise 30 2 1:20pm-1:50pm   [x]  Manual Therapy 10 1 1:50pm-2:00pm   []  Ther Activities      []  Aquatics      []  Vasocompression      []  Other:       Total Treatment time 50 3 1:10pm-2:00pm     Assessment: [] Progressing toward goals. [] No change. [x] Other: Initaited tx with MHP x10' with positive relief reported after. PTA then completed IASTM vis hypervolt to R UT and mid trap. PTA then performed PROM with gentle LAD and A/P mobilizations with fair rukhsana, c/o incr pain at the end of all ranges. Pt then completed side lying RTC strengthening exercises with fair to good rukhsana. Pt then completed scapular strengthening exercises with min cues req to derc UT compensation. PTA then completed STM to L forearm extensors with mod tightness noted throughout. Plan to progress as able. [x] Patient would continue to benefit from skilled physical therapy services in order to: inc R shoulder A/PROM; R wrist A/PROM; R shoulder/wrist gross strength; and overall improve tolerance for ADLs with R UE, sleeping, and work tasks. STG: (to be met in 6 treatments)  ? Pain: Patient will report < 6/10 pain at rest and < 8/10 pain with active movement in order to improve QOL.- NOT MET- 5-8/10 over this past week   ? ROM: Will demo R shoulder AROM to match L and with < 5/10 P at end-ranges in order to improve functional mobility. Also R wrist to match L and with < 5/10 P at end-ranges. Will report dec incidence of R shoulder popping with P as well.- NOT MET- reports same frequency of R shoulder popping- every day- but intermittent with P; 150 deg- ABD, 155 deg- FLEX- both with P and stiffness at end of each motion; 75 deg- A/PROM- of ER/IR without P at end of each motion   ? Strength: Will demo 4+/5 R shoulder gross strength, R wrist gross strength with < 5/10 P in order to better match L and improve light ADLs. - PROGRESSING- 4/5 ER with P, 4+/5 IR without P, 4/5 ABD with P, 4/5 FLEX without P  ? Function: Patient will report decreased TTP to R UT/LS/supra, wrist extensor in order to indicate decreased inflammation and improved healing of injured site. - MET  Patient to be independent with home exercise program as demonstrated by performance with correct form without cues. - UNCLEAR  LTG: (to be met in 12 treatments)  Will report < 5/10 P with sleeping positioning, and lifting of moderately heavy objects for safe return to work. - NOT MET- continued, sig difficulty with sleeping positioning- unable to sleep upon either side    Improve UEFS to < 58% impaired/limited- MET- 44%- NEW GOAL: < 40%               Patient goals: \"building my strength back up and to feel better in the areas that hurt. \"- UNCLEAR    Pt. Education:  [x] Yes  [] No  [] Reviewed Prior HEP/Ed  Access Code: [email or web address]  URL: [email or web address]  Date: 08/01/2022  Prepared by: Calli Scheuermann    Exercises  Seated Scapular Retraction - 1 x daily - 7 x weekly - 2 sets - 10 reps - 3 second hold  Supine Shoulder Flexion Extension AAROM with Dowel - 1 x daily - 7 x weekly - 2 sets - 10 reps  Seated Wrist Flexion with Overpressure - 1 x daily - 7 x weekly - 1 sets - 3 reps - 30s hold  Supine Shoulder External Rotation with Dowel - 1 x daily - 7 x weekly - 2 sets - 10 reps    Method of Education: [x] Verbal  [] Demo  [] Written  Comprehension of Education:  [x] Verbalizes understanding. [] Demonstrates understanding. [] Needs review. [] Demonstrates/verbalizes HEP/Ed previously given. 8/11/2022- See grid above for details. Plan: [x] Continue current frequency toward long and short term goals. [x] Specific Instructions for subsequent treatments: Continue MHP, PROM to R shoulder, hypervolt to R posterior shoulder, DTM R wrist extensors with PROM into FLEX/EXT, and light active exercises for remainder of PT POC secondary to pending follow-up with [facility] and further imaging.       Time In: 1:10pm    Time Out: 2:00pm    Electronically signed by:  Kalyani Atwood PTA

## 2022-08-15 ENCOUNTER — OFFICE VISIT (OUTPATIENT)
Dept: FAMILY MEDICINE CLINIC | Age: 42
End: 2022-08-15
Payer: COMMERCIAL

## 2022-08-15 ENCOUNTER — HOSPITAL ENCOUNTER (OUTPATIENT)
Dept: PHYSICAL THERAPY | Facility: CLINIC | Age: 42
Setting detail: THERAPIES SERIES
Discharge: HOME | End: 2022-08-15
Payer: COMMERCIAL

## 2022-08-15 VITALS
HEART RATE: 98 BPM | WEIGHT: 209 LBS | BODY MASS INDEX: 37.03 KG/M2 | SYSTOLIC BLOOD PRESSURE: 132 MMHG | DIASTOLIC BLOOD PRESSURE: 86 MMHG

## 2022-08-15 DIAGNOSIS — I10 ESSENTIAL HYPERTENSION: Primary | ICD-10-CM

## 2022-08-15 DIAGNOSIS — G56.03 BILATERAL CARPAL TUNNEL SYNDROME: ICD-10-CM

## 2022-08-15 PROCEDURE — 97110 THERAPEUTIC EXERCISES: CPT

## 2022-08-15 PROCEDURE — 99213 OFFICE O/P EST LOW 20 MIN: CPT | Performed by: STUDENT IN AN ORGANIZED HEALTH CARE EDUCATION/TRAINING PROGRAM

## 2022-08-15 PROCEDURE — 97140 MANUAL THERAPY 1/> REGIONS: CPT

## 2022-08-15 NOTE — FLOWSHEET NOTE
UT/posterior shoulder in supine for x8 minutes at start; vaso to finish + ice massage to R wrist extensors- DECLINED to perform ice at home   Precautions: potential R RTC tendinitis with impingement and concurrent R wrist extensor muscle strain d/t work injury   Exercises: current light duty   Exercise Reps/ Time Weight/ Level Comments    Goal update, UEFS, HEP/PT POC education    COMPLETED- 8/11/22  D/C passive wrist extensor stretch for home secondary to inc P; unclear improvements with PT services thus far- may benefit from imaging if deemed appropriate by [facility] and approved through [address] [place] [address]; but will finish remaining PT visits- verbalized understanding to all  -   Supine wand flex/ER/IR 2x10 1lb To 2#- 8/8/22; post MT  TCs with ER/IR -   PROM  x10'  All directions- pain at end ranges- empty end-feels  x          Sidelying- AROM   Re-assessed 8/15/22- improved tolerance without inc P    ER 2x10 ea  Denies inc P x   ABD to 90 deg  2x10  Denies inc P x   HAB 2x10  Denies inc P  x          Seated scap retractions 2x10   -   TB Rows 2x10 Lime   -          Seated R wrist extensor eccentric strengthening 2x10 1lb Focus upon slow lower against gravity   To 2#- 8/15/22 x   Seated R pro/sup 2x10 1lb  -   Other:    Manual: PROM- all motions- to R shoulder post MHP; hypervolt to R posterior shoulder/cuff with bullet attachment on lowest setting; and then finally, DTM R wrist extensors- PROM into FLEX/EXT- x20' total.    R shoulder PROM:  8/8/22- FLEX- 165 deg- P; SCAPT- 170 deg- P; limited with all d/t pt retracting from PT, though does not verbalize P ratings nor request breaks- empt end-feel      Specific Instructions for next treatment: Continue MHP, PROM to R shoulder, hypervolt to R posterior shoulder, DTM R wrist extensors with PROM into FLEX/EXT, and light exercises for remainder of PT POC secondary to pending follow-up with [facility] and further imaging.     Treatment Charges: Mins Units Time in/Out   [x] Modalities- MHP 8 - 12:10-12:18PM   [x]  Ther Exercise 32 2 12:38-1PM   [x]  Manual Therapy 10 1 12:18-12:38PM   []  Ther Activities      []  Aquatics      []  Vasocompression      []  Other: Total Treatment time 50 3 12:10-1PM     Assessment: [] Progressing toward goals. [x] No change. Pt presents with continued, MOD R shoulder and wrist pain, and unclear tolerance to last visit- which included new R RTC strengthening interventions. Therefore, continued MHP, PROM, hypervolt techniques this date. Again, demos guarded nature with PROM- all motions of R shoulder limited by empty end-feels. Re-assessed R RTC strengthening interventions in S/L- denies inc P this date. Finished with inc resistance of R wrist extensors eccentric strengthening- reports \"it's alright. \" Pt to visit [facility] tomorrow- will follow-up. Plan to finish remaining visits within PT POC and then D/C. [] Other:  [x] Patient would continue to benefit from skilled physical therapy services in order to: inc R shoulder A/PROM; R wrist A/PROM; R shoulder/wrist gross strength; and overall improve tolerance for ADLs with R UE, sleeping, and work tasks. STG: (to be met in 6 treatments)  ? Pain: Patient will report < 6/10 pain at rest and < 8/10 pain with active movement in order to improve QOL.- NOT MET- 5-8/10 over this past week   ? ROM: Will demo R shoulder AROM to match L and with < 5/10 P at end-ranges in order to improve functional mobility. Also R wrist to match L and with < 5/10 P at end-ranges. Will report dec incidence of R shoulder popping with P as well.- NOT MET- reports same frequency of R shoulder popping- every day- but intermittent with P; 150 deg- ABD, 155 deg- FLEX- both with P and stiffness at end of each motion; 75 deg- A/PROM- of ER/IR without P at end of each motion   ? Strength: Will demo 4+/5 R shoulder gross strength, R wrist gross strength with < 5/10 P in order to better match L and improve light ADLs. - PROGRESSING- 4/5 ER with P, 4+/5 IR without P, 4/5 ABD with P, 4/5 FLEX without P  ? Function: Patient will report decreased TTP to R UT/LS/supra, wrist extensor in order to indicate decreased inflammation and improved healing of injured site. - MET  Patient to be independent with home exercise program as demonstrated by performance with correct form without cues. - UNCLEAR  LTG: (to be met in 12 treatments)  Will report < 5/10 P with sleeping positioning, and lifting of moderately heavy objects for safe return to work. - NOT MET- continued, sig difficulty with sleeping positioning- unable to sleep upon either side    Improve UEFS to < 58% impaired/limited- MET- 44%- NEW GOAL: < 40%               Patient goals: \"building my strength back up and to feel better in the areas that hurt. \"- UNCLEAR    Pt. Education:  [] Yes  [x] No  [] Reviewed Prior HEP/Ed  Access Code: [email or web address]  URL: [email or web address]  Date: 08/01/2022  Prepared by: Calli Scheuermann    Exercises  Seated Scapular Retraction - 1 x daily - 7 x weekly - 2 sets - 10 reps - 3 second hold  Supine Shoulder Flexion Extension AAROM with Dowel - 1 x daily - 7 x weekly - 2 sets - 10 reps  Seated Wrist Flexion with Overpressure - 1 x daily - 7 x weekly - 1 sets - 3 reps - 30s hold  Supine Shoulder External Rotation with Dowel - 1 x daily - 7 x weekly - 2 sets - 10 reps    Method of Education: [] Verbal  [] Demo  [] Written  Comprehension of Education:  [] Verbalizes understanding. [] Demonstrates understanding. [] Needs review. [] Demonstrates/verbalizes HEP/Ed previously given. 8/11/2022- See grid above for details. Plan: [x] Continue current frequency toward long and short term goals.     [x] Specific Instructions for subsequent treatments: Continue MHP, PROM to R shoulder, hypervolt to R posterior shoulder, DTM R wrist extensors with PROM into FLEX/EXT, and light active exercises for remainder of PT POC secondary to pending follow-up with [facility] and further imaging.       Time In: 12:10PM  Time Out: 1PM    Electronically signed by:  Aysha Khoury PT

## 2022-08-15 NOTE — PROGRESS NOTES
Osmin Cabrera (:  1980) is a 40 y.o. female,Established patient, here for evaluation of the following chief complaint(s):  Hypertension (Follow up)         ASSESSMENT/PLAN:  1. Essential hypertension   -Well-controlled, asymptomatic   -Continue amlodipine and lisinopril-hydrochlorothiazide once daily as directed. 2. Bilateral carpal tunnel syndrome  -     DME Order for Orthosis as OP  - Recommended patient schedule EMG test as ordered by orthopedic surgery. - Patient provided with copy of test information for scheduling.  - Recommended patient to wear elastic wrist brace at night. Patient reports that hard cast would be more beneficial in alleviating her pain. .        Return in about 2 months (around 10/15/2022). Subjective   SUBJECTIVE/OBJECTIVE:  HPI    Hypertension:  Home blood pressure monitoring: No.  She  is not but has started using low salt seasoning and decreasing food fast intake adherent to a low sodium diet. Patient denies chest pain, shortness of breath, headache, lightheadedness, blurred vision, peripheral edema, palpitations, dry cough, and fatigue. Antihypertensive medication side effects: no medication side effects noted. Use of agents associated with hypertension: none. Previous visit patient was started on amlodipine 10 mg once daily in addition to lisinopril-hydrochlorothiazide 20-12.5 mg. Patient is tolerating medication well and denies side effects such as hives, headache, constipation, lower extremity edema. Sodium (mmol/L)   Date Value   2021 140    BUN (mg/dL)   Date Value   2021 14    Glucose (mg/dL)   Date Value   2021 83      Potassium (mmol/L)   Date Value   2021 4.2    Creatinine (mg/dL)   Date Value   2021 0.56         Carpal tunnel   Patient continue to have significant numbness and tingling in fingertips of right hand.   Reports continue repetitive motion at work which has exacerbated her pain. Patient was evaluated by orthopedic surgery and a EMG was ordered. Patient has yet to complete study due to busy work schedule. Review of Systems   Constitutional:  Negative for appetite change, fatigue and fever. HENT:  Negative for ear pain, rhinorrhea and sore throat. Eyes:  Negative for discharge and visual disturbance. Respiratory:  Negative for cough and shortness of breath. Cardiovascular:  Negative for chest pain and palpitations. Gastrointestinal:  Negative for abdominal pain, constipation, diarrhea, nausea and vomiting. Endocrine: Negative for polyuria. Genitourinary:  Negative for dysuria. Musculoskeletal:  Positive for arthralgias. Skin:  Negative for rash. Neurological:  Negative for dizziness, weakness, light-headedness and headaches. Psychiatric/Behavioral:  Negative for agitation. Objective   Physical Exam  Constitutional:       General: She is not in acute distress. Appearance: Normal appearance. She is obese. Cardiovascular:      Rate and Rhythm: Normal rate and regular rhythm. Heart sounds: Normal heart sounds. No murmur heard. No friction rub. Pulmonary:      Breath sounds: Normal breath sounds. No wheezing or rales. Abdominal:      Tenderness: There is no guarding. Musculoskeletal:         General: Tenderness present. No swelling. Normal range of motion. Right lower leg: No edema. Left lower leg: No edema. Comments: Positive Tinel sign and Phalen sign. Skin:     Findings: No rash. Neurological:      Mental Status: She is alert and oriented to person, place, and time. Psychiatric:         Mood and Affect: Mood normal.                An electronic signature was used to authenticate this note.     --Frida Rodriguez MD

## 2022-08-15 NOTE — PROGRESS NOTES
HYPERTENSION visit     BP Readings from Last 3 Encounters:   07/11/22 (!) 158/108   03/03/22 130/86   10/13/21 (!) 140/89       LDL Cholesterol (mg/dL)   Date Value   09/21/2021 109     HDL (mg/dL)   Date Value   09/21/2021 65     BUN (mg/dL)   Date Value   09/21/2021 14     Creatinine (mg/dL)   Date Value   09/21/2021 0.56     Glucose (mg/dL)   Date Value   09/21/2021 83              Have you changed or started any medications since your last visit including any over-the-counter medicines, vitamins, or herbal medicines? no   Have you stopped taking any of your medications? Is so, why? -  no  Are you having any side effects from any of your medications? - no  How often do you miss doses of your medication? no      Have you seen any other physician or provider since your last visit?  no   Have you had any other diagnostic tests since your last visit?  no   Have you been seen in the emergency room and/or had an admission in a hospital since we last saw you?  no   Have you had your routine dental cleaning in the past 6 months?  no     Do you have an active MyChart account? If no, what is the barrier?   Yes    Patient Care Team:  Antwan Roland MD as PCP - General (Emergency Medicine)  Cecily Stanley DO as PCP - [facility] Provider  Zoila Vera MD as Consulting Physician (Obstetrics & Gynecology)    Medical History Review  Past Medical, Family, and Social History reviewed and does not contribute to the patient presenting condition    Health Maintenance   Topic Date Due    COVID-19 Vaccine (1) Never done    Varicella vaccine (1 of 2 - 2-dose childhood series) Never done    Pneumococcal 0-64 years Vaccine (2 - PCV) 02/20/2019    Flu vaccine (1) 09/01/2022    Depression Screen  07/11/2023    DTaP/Tdap/Td vaccine (2 - Td or Tdap) 03/17/2025    Lipids  09/21/2026    Hepatitis C screen  Completed    HIV screen  Completed    Hepatitis A vaccine  Aged Out    Hepatitis B vaccine  Aged Out    Hib vaccine  Aged Out Meningococcal (ACWY) vaccine  Aged Out

## 2022-08-15 NOTE — PATIENT INSTRUCTIONS
Thank you for letting us take care of you today. We hope all your questions were addressed. If a question was overlooked or something else comes to mind after you return home, please contact a member of your Care Team listed below. Your Care Team at [place] [address] is Team #2  Kourtney Chau DO (Faculty)  Jose Dominguez (Faculty)  Blanca Gaspar MD (Resident)  Jayjay Gallego MD (Resident)  Normajean Castleman, MD (Resident)  Rohit Aguilar MD (Resident)  John Ca., ZARA Cisse.,  DIONY Soria., STACY Sue., [facility] office)  Sergio Wiley, [address] (Clinical Practice Manager)  Lilia David [facility] (Clinical Pharmacist)     Office phone number: [phone]    If you need to get in right away due to illness, please be advised we have \"Same Day\" appointments available Monday-Friday. Please call us at [phone] option #3 to schedule your \"Same Day\" appointment.

## 2022-08-16 ASSESSMENT — ENCOUNTER SYMPTOMS
RHINORRHEA: 0
DIARRHEA: 0
ABDOMINAL PAIN: 0
SORE THROAT: 0
NAUSEA: 0
COUGH: 0
VOMITING: 0
CONSTIPATION: 0
EYE DISCHARGE: 0
SHORTNESS OF BREATH: 0

## 2022-08-17 ENCOUNTER — HOSPITAL ENCOUNTER (OUTPATIENT)
Dept: PHYSICAL THERAPY | Facility: CLINIC | Age: 42
Setting detail: THERAPIES SERIES
Discharge: HOME | End: 2022-08-17
Payer: COMMERCIAL

## 2022-08-17 PROCEDURE — 97110 THERAPEUTIC EXERCISES: CPT

## 2022-08-17 NOTE — FLOWSHEET NOTE
[] [facility]  [address]    [phone]  F: [phone]   [] [address]  [facility]   [address]  P: [phone]  F: [phone]  [] [place]  [address] [facility]  P: [phone]  F: [phone]   [x] [address]  P: [phone]  F: [phone]  [] [facility]  [address] [facility] [address]  [place]: [phone]   F: [phone] [] [address]   [place]: [phone]  F: [phone]     Date:  2022  Patient Name:  Carol Almonte    :  1980  MRN: [id number]  Physician: Chey Casillas MD                     Insurance: [address] [place] [address]: 3x week, for x3 weeks- 22-22  Medical Diagnosis:     C21.69GM (ICD-10-CM) - Sprain of unspecified part of right wrist and hand, initial encounter   S53.401A (ICD-10-CM) - Unspecified sprain of right elbow, initial encounter   S43.401A (ICD-10-CM) - Unspecified sprain of right shoulder joint, initial encounter   Rehab Codes: Jesi Rosana; S53.401A; S43.401A; M75.21; M75.4; M62.81  Onset Date: 22; see below                   Next 's appt: 22- [facility]   Visit# / total visits:     Cancels/No Shows: 1/0    Subjective:    Pain:  [x] Yes  [] No Location: R UE globally Pain Rating: (0-10 scale) R Shoulder 5/10  Pain altered Tx:  [] No  [x] Yes  Action: focused on manual, HP and PROM  Comments: Pt states that shoulder is \"the same\", states that entire RUE continues to be sore.      Objective:  Modalities: MHP to R UT/posterior shoulder in supine for x8 minutes at start; vaso to finish + ice massage to R wrist extensors- DECLINED to perform ice at home   Precautions: potential R RTC tendinitis with impingement and concurrent R wrist extensor muscle strain d/t work injury   Exercises: current light duty   Exercise Reps/ Time Weight/ Level Comments    Goal update, UEFS, HEP/PT POC education    COMPLETED- 8/11/22  D/C passive wrist extensor stretch for home secondary to inc P; unclear improvements with PT services thus far- may benefit from imaging if deemed appropriate by [facility] and approved through [facility]; but will finish remaining PT visits- verbalized understanding to all  -   Supine wand flex/ER/IR 2x10 1lb To 2#- 8/8/22; post MT  TCs with ER/IR -   PROM  x10'  All directions- pain at end ranges- empty end-feels  x          Sidelying- AROM   Re-assessed 8/15/22- improved tolerance without inc P    ER 2x10 ea  Denies inc P x   ABD  2x10  Denies inc P- increased to full range 8/17 x   HAB 2x10  Denies inc P  x          Seated scap retractions 2x10   -   TB Rows 2x10 Lime   x   Standing wand flexion 2x10 1#     Seated R wrist extensor eccentric strengthening 2x10 2# Focus upon slow lower against gravity   To 2#- 8/15/22 x   Seated R pro/sup 2x10 1lb  -   Other:    Manual: hypervolt to R UT with bullet attachment on lowest setting; and then finally- x20' total.    R shoulder PROM:  8/8/22- FLEX- 165 deg- P; SCAPT- 170 deg- P; limited with all d/t pt retracting from PT, though does not verbalize P ratings nor request breaks- empt end-feel      Specific Instructions for next treatment: Continue MHP, PROM to R shoulder, hypervolt to R posterior shoulder, DTM R wrist extensors with PROM into FLEX/EXT, and light exercises for remainder of PT POC secondary to pending follow-up with [facility] and further imaging. Treatment Charges: Mins Units Time in/Out   [x]  Modalities- MHP 10 0 1258-1:08   [x]  Ther Exercise 38 3 1:08-1:46   []  Manual Therapy      []  Ther Activities      []  Aquatics      []  Vasocompression      []  Other:       Total Treatment time 48 3 1258-146     Assessment: []

## 2022-08-18 ENCOUNTER — HOSPITAL ENCOUNTER (OUTPATIENT)
Dept: PHYSICAL THERAPY | Facility: CLINIC | Age: 42
Setting detail: THERAPIES SERIES
Discharge: HOME | End: 2022-08-18
Payer: COMMERCIAL

## 2022-08-18 PROCEDURE — 97140 MANUAL THERAPY 1/> REGIONS: CPT

## 2022-08-18 PROCEDURE — 97016 VASOPNEUMATIC DEVICE THERAPY: CPT

## 2022-08-18 PROCEDURE — 97110 THERAPEUTIC EXERCISES: CPT

## 2022-08-18 NOTE — FLOWSHEET NOTE
extensor muscle strain d/t work injury   Exercises: current light duty   Exercise Reps/ Time Weight/ Level Comments    Goal update, UEFS, HEP/PT POC education    COMPLETED- 8/11/22  D/C passive wrist extensor stretch for home secondary to inc P; unclear improvements with PT services thus far- may benefit from imaging if deemed appropriate by [facility] and approved through [facility]; but will finish remaining PT visits- verbalized understanding to all  -   UBE  2' ea   AAROM, L UE doing most of the work x   [employer]  2' ea    x   Supine wand flex/ER/IR 2x10 1lb To 2#- 8/8/22; post MT  TCs with ER/IR -   PROM  x10'  All directions- pain at end ranges- empty end-feels  -          Sidelying- AROM   Re-assessed 8/15/22- improved tolerance without inc P    ER 2x15  Denies inc P x   ABD  2x10  Denies inc P- increased to full range 8/17 x   HAB 2x10  Denies inc P  x          Seated scap retractions 2x10   -   TB Rows 2x10 Lime   x   Standing wand flexion 2x10 1#     Seated R wrist extensor eccentric strengthening 2x10 2# Focus upon slow lower against gravity   To 2#- 8/15/22 x   Seated R pro/sup 2x10 1lb  -   Other:    Manual:  hypervolt to R UT and mid trap with accordian attachment on lowest setting  Deep STM to R forearm extensors     R shoulder PROM:  8/8/22- FLEX- 165 deg- P; SCAPT- 170 deg- P; limited with all d/t pt retracting from PT, though does not verbalize P ratings nor request breaks- empt end-feel      Specific Instructions for next treatment:     Treatment Charges: Mins Units Time in/Out   []  Modalities- MHP      [x]  Ther Exercise 30 2 12:10pm-12:40pm   [x]  Manual Therapy 5, 5 1 12:05pm-12:10pm, 12:40pm-12:45pm   []  Ther Activities      []  Aquatics      [x]  Vasocompression 15 1 12:45pm-1:00pm   []  Other: Total Treatment time 55 4 12:05pm-1:00pm     Assessment: [] Progressing toward goals. [x] No change.  Initiated tx with IASTM vis Hypervolt to R UT and mid trap to decrease tension with slight positive relief reported after. Added UBE and Pulleys for AAROM (with cues to use L UE to do most of the work). Pt reports \"popping\" at end range scaption with pulleys, however denies incr pain. Pt then resumed gentle scapular and RTC strengthening exercises. PTA then completed deep STM to forearm globally, focusing on wrist extensors it positive relief reported after. Ended with vaso for pain and inflammation management. Positive relief reported after. Plan to hold PT until MRI results from MRI next Wednesday. [] Other:  [x] Patient would continue to benefit from skilled physical therapy services in order to: inc R shoulder A/PROM; R wrist A/PROM; R shoulder/wrist gross strength; and overall improve tolerance for ADLs with R UE, sleeping, and work tasks. STG: (to be met in 6 treatments)  ? Pain: Patient will report < 6/10 pain at rest and < 8/10 pain with active movement in order to improve QOL.- NOT MET- 5-8/10 over this past week   ? ROM: Will demo R shoulder AROM to match L and with < 5/10 P at end-ranges in order to improve functional mobility. Also R wrist to match L and with < 5/10 P at end-ranges. Will report dec incidence of R shoulder popping with P as well.- NOT MET- reports same frequency of R shoulder popping- every day- but intermittent with P; 150 deg- ABD, 155 deg- FLEX- both with P and stiffness at end of each motion; 75 deg- A/PROM- of ER/IR without P at end of each motion   ? Strength: Will demo 4+/5 R shoulder gross strength, R wrist gross strength with < 5/10 P in order to better match L and improve light ADLs. - PROGRESSING- 4/5 ER with P, 4+/5 IR without P, 4/5 ABD with P, 4/5 FLEX without P  ? Function: Patient will report decreased TTP to R UT/LS/supra, wrist extensor in order to indicate decreased inflammation and improved healing of injured site. - MET  Patient to be independent with home exercise program as demonstrated by performance with correct form without cues. -

## 2022-08-24 ENCOUNTER — HOSPITAL ENCOUNTER (OUTPATIENT)
Dept: MRI IMAGING | Facility: CLINIC | Age: 42
Discharge: HOME | End: 2022-08-26
Payer: COMMERCIAL

## 2022-08-24 DIAGNOSIS — S63.91XA SPRAIN OF RIGHT HAND, INITIAL ENCOUNTER: ICD-10-CM

## 2022-08-24 DIAGNOSIS — S43.401A SPRAIN OF RIGHT SHOULDER, UNSPECIFIED SHOULDER SPRAIN TYPE, INITIAL ENCOUNTER: ICD-10-CM

## 2022-08-24 DIAGNOSIS — S53.401A SPRAIN OF RIGHT ELBOW, INITIAL ENCOUNTER: ICD-10-CM

## 2022-08-24 PROCEDURE — 73221 MRI JOINT UPR EXTREM W/O DYE: CPT

## 2022-11-17 ENCOUNTER — TELEPHONE (OUTPATIENT)
Dept: ORTHOPEDIC SURGERY | Age: 42
End: 2022-11-17

## 2022-11-17 NOTE — TELEPHONE ENCOUNTER
Gladys from [facility] called in regards to patient. She has an appointment 1/30/23 for EMG, they're moving people up and need a C-9 for patients EMG.      CB#:[phone] [facility]

## 2022-11-18 NOTE — TELEPHONE ENCOUNTER
Patient was not documented as a workers compensation patient. Unable to reach patient. Called Saskia Duarte and left [facility].

## 2022-12-20 ENCOUNTER — OFFICE VISIT (OUTPATIENT)
Dept: FAMILY MEDICINE CLINIC | Age: 42
End: 2022-12-20
Payer: COMMERCIAL

## 2022-12-20 VITALS
HEIGHT: 63 IN | DIASTOLIC BLOOD PRESSURE: 84 MMHG | SYSTOLIC BLOOD PRESSURE: 130 MMHG | HEART RATE: 87 BPM | WEIGHT: 203.8 LBS | BODY MASS INDEX: 36.11 KG/M2 | TEMPERATURE: 98.9 F

## 2022-12-20 DIAGNOSIS — G56.03 BILATERAL CARPAL TUNNEL SYNDROME: ICD-10-CM

## 2022-12-20 DIAGNOSIS — I10 ESSENTIAL HYPERTENSION: Primary | ICD-10-CM

## 2022-12-20 DIAGNOSIS — Z72.0 TOBACCO USE: ICD-10-CM

## 2022-12-20 PROCEDURE — 3074F SYST BP LT 130 MM HG: CPT | Performed by: STUDENT IN AN ORGANIZED HEALTH CARE EDUCATION/TRAINING PROGRAM

## 2022-12-20 PROCEDURE — 3078F DIAST BP <80 MM HG: CPT | Performed by: STUDENT IN AN ORGANIZED HEALTH CARE EDUCATION/TRAINING PROGRAM

## 2022-12-20 PROCEDURE — 99213 OFFICE O/P EST LOW 20 MIN: CPT | Performed by: STUDENT IN AN ORGANIZED HEALTH CARE EDUCATION/TRAINING PROGRAM

## 2022-12-20 RX ORDER — NAPROXEN 500 MG/1
500 TABLET ORAL 2 TIMES DAILY PRN
Qty: 60 TABLET | Refills: 1 | Status: SHIPPED | OUTPATIENT
Start: 2022-12-20

## 2022-12-20 RX ORDER — BLOOD PRESSURE TEST KIT
1 KIT MISCELLANEOUS 2 TIMES DAILY
Qty: 1 KIT | Refills: 0 | Status: SHIPPED | OUTPATIENT
Start: 2022-12-20

## 2022-12-20 RX ORDER — LISINOPRIL AND HYDROCHLOROTHIAZIDE 20; 12.5 MG/1; MG/1
TABLET ORAL
Qty: 30 TABLET | Refills: 5 | Status: SHIPPED | OUTPATIENT
Start: 2022-12-20

## 2022-12-20 RX ORDER — AMLODIPINE BESYLATE 10 MG/1
10 TABLET ORAL DAILY
Qty: 30 TABLET | Refills: 2 | Status: SHIPPED | OUTPATIENT
Start: 2022-12-20

## 2022-12-20 ASSESSMENT — ENCOUNTER SYMPTOMS
EYE DISCHARGE: 0
VOMITING: 0
ABDOMINAL PAIN: 0
CONSTIPATION: 0
SORE THROAT: 0
NAUSEA: 0
SHORTNESS OF BREATH: 0
RHINORRHEA: 0
COUGH: 0
DIARRHEA: 0

## 2022-12-20 NOTE — PATIENT INSTRUCTIONS
Thank you for letting us take care of you today. We hope all your questions were addressed. If a question was overlooked or something else comes to mind after you return home, please contact a member of your Care Team listed below. Your Care Team at [place] [address] is Team #2  Sherry Osman DO (Faculty)  Salvador Singletary (Faculty)  Andrew Robles MD (Resident)  Narcisa Mcdaniels MD (Resident)  Sherman Hernandez MD (Resident)  Evan Munson MD (Resident)  Kadeem Vaqsuez., KAVEH [place].,  DIONY Lomax., LPN  Lily Moreno., [facility])  Rina Singh, [address] (Clinical Practice Manager)  Joyce Jasso, [facility] (Clinical Pharmacist)     Office phone number: [phone]    If you need to get in right away due to illness, please be advised we have \"Same Day\" appointments available Monday-Friday. Please call us at [phone] option #3 to schedule your \"Same Day\" appointment.

## 2022-12-20 NOTE — PROGRESS NOTES
Gagandeep Plata (:  1980) is a 40 y.o. female,Established patient, here for evaluation of the following chief complaint(s):  Hypertension (Follow up, no headaches, BP has been good )         ASSESSMENT/PLAN:  1. Essential hypertension  -     Blood Pressure KIT; 2 TIMES DAILY Starting 2022, Disp-1 kit, R-0, Normal  -     amLODIPine (NORVASC) 10 MG tablet; Take 1 tablet by mouth daily, Disp-30 tablet, R-2Normal  -     lisinopril-hydroCHLOROthiazide (PRINZIDE;ZESTORETIC) 20-12.5 MG per tablet; TAKE 1 TABLET BY MOUTH ONE TIME A DAY, Disp-30 tablet, R-5Normal  2. Bilateral carpal tunnel syndrome  -     Karen Simons DO, Orthopedic Surgery (Hand), [facility]  -     naproxen (NAPROSYN) 500 MG tablet; Take 1 tablet by mouth 2 times daily as needed for Pain, Disp-60 tablet, R-1Normal  3. Tobacco use  Referral in [place]     Return in about 1 month (around 2023). Subjective   SUBJECTIVE/OBJECTIVE:  HPI      Treatment Adherence:   Medication compliance:  compliant all of the time  Diet compliance:  compliant most of the time  Weight trend: stable  Current exercise: no regular exercise  Barriers: time constraints    Hypertension:  Home blood pressure monitoring: No.  She is adherent to a low sodium diet. Patient denies chest pain, shortness of breath, headache, lightheadedness, blurred vision, peripheral edema, palpitations, dry cough, and fatigue. Antihypertensive medication side effects: no medication side effects noted. Use of agents associated with hypertension: none.          Lab Results   Component Value Date    LABA1C 5.3 2014     Lab Results   Component Value Date    LABMICR 14 2015    CREATININE 0.56 2021     Lab Results   Component Value Date    ALT 18 2015    AST 15 2015     Lab Results   Component Value Date    CHOL 191 2021    TRIG 84 2021    HDL 65 2021          Review of Systems   Constitutional:  Positive for fatigue. Negative for appetite change and fever. HENT:  Negative for ear pain, rhinorrhea and sore throat. Eyes:  Negative for discharge and visual disturbance. Respiratory:  Negative for cough and shortness of breath. Cardiovascular:  Negative for chest pain and palpitations. Gastrointestinal:  Negative for abdominal pain, constipation, diarrhea, nausea and vomiting. Endocrine: Negative for polyuria. Genitourinary:  Negative for dysuria. Musculoskeletal:  Positive for myalgias. Negative for arthralgias. Skin:  Negative for rash. Neurological:  Negative for dizziness, weakness, light-headedness and headaches. Psychiatric/Behavioral:  Negative for agitation. Objective   Physical Exam  Vitals reviewed. Constitutional:       General: She is not in acute distress. Appearance: Normal appearance. Cardiovascular:      Rate and Rhythm: Normal rate and regular rhythm. Heart sounds: Normal heart sounds. No murmur heard. No friction rub. Pulmonary:      Breath sounds: Normal breath sounds. No wheezing or rales. Abdominal:      General: Bowel sounds are normal.      Palpations: Abdomen is soft. Tenderness: There is no abdominal tenderness. There is no guarding or rebound. Musculoskeletal:         General: No swelling or tenderness. Skin:     Findings: No rash. Neurological:      Mental Status: She is alert and oriented to person, place, and time. Psychiatric:         Mood and Affect: Mood normal.                An electronic signature was used to authenticate this note.     --Bobo Alvarez MD

## 2022-12-20 NOTE — PROGRESS NOTES
Attending Physician Statement  I have discussed the care of Rupa Candelario, including pertinent history and exam findings,  with the resident. I have reviewed the key elements of all parts of the encounter with the resident. I agree with the assessment, plan and orders as documented by the resident.   (Coco Skipper)    Ike Cannon MD

## 2022-12-20 NOTE — PROGRESS NOTES
HYPERTENSION visit     BP Readings from Last 3 Encounters:   08/15/22 132/86   07/11/22 (!) 158/108   03/03/22 130/86       LDL Cholesterol (mg/dL)   Date Value   09/21/2021 109     HDL (mg/dL)   Date Value   09/21/2021 65     BUN (mg/dL)   Date Value   09/21/2021 14     Creatinine (mg/dL)   Date Value   09/21/2021 0.56     Glucose (mg/dL)   Date Value   09/21/2021 83              Have you changed or started any medications since your last visit including any over-the-counter medicines, vitamins, or herbal medicines? no   Have you stopped taking any of your medications? Is so, why? -  no  Are you having any side effects from any of your medications? - no  How often do you miss doses of your medication? rare      Have you seen any other physician or provider since your last visit? yes - PT   Have you had any other diagnostic tests since your last visit?  no   Have you been seen in the emergency room and/or had an admission in a hospital since we last saw you?  no   Have you had your routine dental cleaning in the past 6 months?  no     Do you have an active MyChart account? If no, what is the barrier?   No: Declines    Patient Care Team:  Lorin Jimenez MD as PCP - General (Emergency Medicine)  Abby Barton DO as PCP - [facility]  Alejandro Salvador MD as Consulting Physician (Obstetrics & Gynecology)    Medical History Review  Past Medical, Family, and Social History reviewed and does contribute to the patient presenting condition    Health Maintenance   Topic Date Due    COVID-19 Vaccine (1) Never done    Varicella vaccine (1 of 2 - 2-dose childhood series) Never done    Flu vaccine (1) 08/01/2022    Depression Screen  07/11/2023    DTaP/Tdap/Td vaccine (2 - Td or Tdap) 03/17/2025    Lipids  09/21/2026    Pneumococcal 0-64 years Vaccine  Completed    Hepatitis C screen  Completed    HIV screen  Completed    Hepatitis A vaccine  Aged Out    Hib vaccine  Aged Out    Meningococcal (ACWY) vaccine  Aged Out

## 2022-12-27 ENCOUNTER — TELEPHONE (OUTPATIENT)
Dept: FAMILY MEDICINE CLINIC | Age: 42
End: 2022-12-27

## 2022-12-27 NOTE — TELEPHONE ENCOUNTER
----- Message from Gregory Jackson sent at 12/27/2022  3:26 PM EST -----  Subject: Message to Provider    QUESTIONS  Information for Provider? Pt is calling in to follow up on [facility] paperwork. Pt states she gave the paperwork to the office staff on 12/20/22. Please   advise.   ---------------------------------------------------------------------------  --------------  Lon SZYMANSKI  [phone]; OK to leave message on voicemail  ---------------------------------------------------------------------------  --------------  SCRIPT ANSWERS  Relationship to Patient?  Self

## 2022-12-27 NOTE — TELEPHONE ENCOUNTER
Writer spoke with patient and informed her that FMLA was faxed today and completed forms are scanned into media.

## 2023-01-06 ENCOUNTER — TELEPHONE (OUTPATIENT)
Dept: FAMILY MEDICINE CLINIC | Age: 43
End: 2023-01-06

## 2023-01-06 NOTE — TELEPHONE ENCOUNTER
Writer called patient and informed her that papers were faxed and confirmation was received. Writer tried to [employer] with no success. Patient stated she would call.

## 2023-01-06 NOTE — TELEPHONE ENCOUNTER
Patient called upset that [facility] did not receive her paper work. Writer told patient FMLA was completed on 12/21/22, but there was a error on it the provider fixed the error on 12/27/22 and it was faxed and confirmation was received. Writer told patient forms will be re faxed and a copy will be waiting for her to . Patient declined to  a copy. Writer will wait for the confirmation of the forms being faxed today and then will call [facility] to confirm with them. And will follow up with the patient.

## 2023-01-11 ENCOUNTER — TELEPHONE (OUTPATIENT)
Dept: ORTHOPEDIC SURGERY | Age: 43
End: 2023-01-11

## 2023-01-11 NOTE — TELEPHONE ENCOUNTER
Matt Albert from [facility] emg scheduling called stating that patient says her EMG was work related and needed a C9. Informed her that patient has workers comp claim for different claim that happened after office visit in 03/22. Spoke with patient as well and gave her contact information for kajal for more information.

## 2023-01-30 ENCOUNTER — OFFICE VISIT (OUTPATIENT)
Dept: ORTHOPEDIC SURGERY | Age: 43
End: 2023-01-30

## 2023-01-30 VITALS — WEIGHT: 204 LBS | BODY MASS INDEX: 36.14 KG/M2 | RESPIRATION RATE: 16 BRPM | HEIGHT: 63 IN

## 2023-01-30 DIAGNOSIS — S66.911A MUSCLE STRAIN OF RIGHT WRIST, INITIAL ENCOUNTER: Primary | ICD-10-CM

## 2023-01-30 NOTE — LETTER
Dr. Nigel Chappell, [facility] AND SPORTS MEDICINE  [place] [address]  Dept: [phone]  Dept Fax: [phone]        2/1/23    Patient: Capo Lozano  YOB: 1980    Dear Charisma Rios MD,    I had the pleasure of seeing one of your patients, Chelsea Chance today in the office. Below are the relevant portions of my assessment and plan of care. IMPRESSION:  1. Muscle strain of right wrist, initial encounter      PLAN:  It is difficult to ascertain from that history and clinical exam if the patient's symptoms reflect neurologic injury such as might seem to be seen with carpal tunnel syndrome. Result is felt that an EMG nerve conduction study should be ordered. I discussed that with the patient he would like to proceed with that. She is going to continue her current activities and I plan to see her back after the EMG is complete. The likely the pain to the bilateral upper extremities is caused from an overuse at work. The very nature of her job requires overuse. If the patient continues to have issues at work she may need to consider restrictions from overuse or changing jobs. She notes understanding. Thank you for allowing me to participate in the care of this patient. I will keep you updated on this patient's follow up and I look forward to serving you and your patients again in the future. Please don't hesitate to contact me at my mobile number [phone].         Dimitrios Henderson

## 2023-02-01 ASSESSMENT — ENCOUNTER SYMPTOMS
EYE DISCHARGE: 0
ROS SKIN COMMENTS: NEGATIVE FOR RASH
SHORTNESS OF BREATH: 0
ABDOMINAL PAIN: 0

## 2023-02-01 NOTE — PROGRESS NOTES
[address]  AND SPORTS MEDICINE  [address] [facility] [address]  Dept: [phone]    Ambulatory Orthopedic Consult    [facility] claim number: [id number]  Date of injury: 6/29/2022  Diagnosis: R13.951V  Right Shoulder strain  C40.927M. Right elbow strain  S66.911A right wrist sprain            CHIEF COMPLAINT:    Chief Complaint   Patient presents with    Wrist Pain     Bilateral- carpel tunnel        HISTORY OF PRESENT ILLNESS:      The patient is a 44 y.o. female who is being seen at the request of  Johan Coto MD for consultation and evaluation of right wrist shoulder and elbow pain/strain. This is a workers comp case. Date of injury 6/29/2022. The patient has a right-hand-dominant female. 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 She felt like the repetitive nature of that job caused her pain to her wrist, elbow, shoulder. She has had some pain to those areas before with a job but those were significantly worse on this particular date. That her bilateral hands are numb with the right being more numb than the left. He has trouble sleeping overnight but states that the pain is weak keeps her awake. She wears braces at work and at night. She has had physical therapy with mild improvement in her symptoms. Notes pain is much worse with a regular job and a little better with modified activities at work. She notes that some days the pain is worse in her hands and then some days its worse in her forearms.       Past Medical History:    Past Medical History:   Diagnosis Date    Abdominal pain     Bilateral carpal tunnel syndrome 11/7/2018    Fibroids     HTN (hypertension) 1/30/2015    Menometrorrhagia     Obesity (BMI 30.0-34.9) 5/26/2015       Past Surgical History:    Past Surgical History:   Procedure Laterality Date    HYSTERECTOMY, TOTAL ABDOMINAL (CERVIX REMOVED)  5/26/15    KNEE SURGERY      left    ESTELA AND BSO (CERVIX REMOVED)  5/26/15       Current Medications:   Current Outpatient Medications   Medication Sig Dispense Refill    Blood Pressure KIT 1 box by Does not apply route 2 times daily 1 kit 0    amLODIPine (NORVASC) 10 MG tablet Take 1 tablet by mouth daily 30 tablet 2    lisinopril-hydroCHLOROthiazide (PRINZIDE;ZESTORETIC) 20-12.5 MG per tablet TAKE 1 TABLET BY MOUTH ONE TIME A DAY 30 tablet 5    naproxen (NAPROSYN) 500 MG tablet Take 1 tablet by mouth 2 times daily as needed for Pain 60 tablet 1    acetaminophen (ACETAMINOPHEN EXTRA STRENGTH) 500 MG tablet TAKE 1 TABLET BY MOUTH TWO TIMES A DAY AS NEEDED FOR PAIN 120 tablet 1    nicotine polacrilex (NICORETTE) 2 MG gum Take 1 each by mouth as needed for Smoking cessation 110 each 3    Elastic Bandages & Supports (CARPAL TUNNEL WRIST STABILIZER) MISC 2 Units by Does not apply route daily 2 each 0    Elastic Bandages & Supports MISC Carpal tunnel splint elastic size small/med for both hands 2 each 0    Elastic Bandages & Supports (WRIST SPLINT ELASTIC/SMALL/MED) MISC Apply to wrist nightly 2 each 0    Elastic Bandages & Supports (WRIST SPLINT LEFT/RIGHT) MISC Use as instructed on both hands 2 each 0     No current facility-administered medications for this visit. Allergies:    Patient has no known allergies. Social History:   Social History     Socioeconomic History    Marital status: Single     Spouse name: Not on file    Number of children: Not on file    Years of education: Not on file    Highest education level: Not on file   Occupational History    Not on file   Tobacco Use    Smoking status: Some Days     Packs/day: 0.25     Years: 15.00     Pack years: 3.75     Types: Cigarettes    Smokeless tobacco: Never   Substance and Sexual Activity    Alcohol use:  Yes     Alcohol/week: 0.0 standard drinks     Comment: occassionally    Drug use: No    Sexual activity: Not Currently   Other Topics Concern    Not on file   Social History Narrative    Not on file     Social Determinants of Health     Financial Resource Strain: Not on file   Food Insecurity: Not on file   Transportation Needs: Not on file   Physical Activity: Not on file   Stress: Not on file   Social Connections: Not on file   Intimate Partner Violence: Not on file   Housing Stability: Not on file       Family History:  Family History   Problem Relation Age of Onset    Diabetes Mother     Arthritis Neg Hx     Asthma Neg Hx     Birth Defects Neg Hx     Cancer Neg Hx     Depression Neg Hx     Early Death Neg Hx     Hearing Loss Neg Hx     Heart Disease Neg Hx     High Blood Pressure Neg Hx     High Cholesterol Neg Hx     Kidney Disease Neg Hx     Learning Disabilities Neg Hx     Mental Illness Neg Hx     Mental Retardation Neg Hx     Miscarriages / Stillbirths Neg Hx     Stroke Neg Hx     Vision Loss Neg Hx     Substance Abuse Neg Hx     Other Neg Hx          REVIEW OF SYSTEMS:  Review of Systems   Constitutional:  Positive for activity change. Negative for fever. HENT:  Negative for dental problem. Eyes:  Negative for discharge. Respiratory:  Negative for shortness of breath. Cardiovascular:  Negative for chest pain. Gastrointestinal:  Negative for abdominal pain. Genitourinary: Negative. Musculoskeletal:  Positive for arthralgias. Skin:         Negative for rash   Neurological:  Positive for weakness. Psychiatric/Behavioral:  Negative for confusion. I have reviewed the CC, HPI, ROS, PMH, FHX, Social History, and if not present in this note, I have reviewed in the patient's chart. I agree with the documentation provided by other staff and have reviewed their documentation prior to providing my signature indicating agreement.     PHYSICAL EXAM:  Resp 16   Ht 5' 2.99\" (1.6 m)   Wt 204 lb (92.5 kg)   LMP 05/17/2015 Comment: blood hcg negative today  BMI 36.15 kg/m² Body mass index is 36.15 kg/m². Physical Exam  Gen: alert and oriented to person and place. Psych:  Appropriate affect; Appropriate knowledge base; Appropriate mood; No hallucinations; Head: normocephalic, atraumatic   Chest: symmetric chest excursion; nonlabored respiratory effort. Pelvis: stable; no obvious pelvis deformity  Ortho Exam  Extremity: No outward deformity of the bilateral upper extremities is appreciated. The patient has full range of motion of her wrist, her hands, her elbows, or shoulders. She also has full range of motion of her cervical spine. No specific dermatomal decreased sensation is appreciated to the bilateral upper extremities. Motor, sensory, vascular examination of bilateral upper extremities appears to be grossly intact without focal deficits. Radiology: No results found. ASSESSMENT:     1. Muscle strain of right wrist, initial encounter         PLAN:       It is difficult to ascertain from that history and clinical exam if the patient's symptoms reflect neurologic injury such as might seem to be seen with carpal tunnel syndrome. Result is felt that an EMG nerve conduction study should be ordered. I discussed that with the patient he would like to proceed with that. She is going to continue her current activities and I plan to see her back after the EMG is complete. The likely the pain to the bilateral upper extremities is caused from an overuse at work. The very nature of her job requires overuse. If the patient continues to have issues at work she may need to consider restrictions from overuse or changing jobs. She notes understanding. No follow-ups on file. No orders of the defined types were placed in this encounter. No orders of the defined types were placed in this encounter.       This note is created with the assistance of a speech recognition program.  While intending to generate a document that actually reflects the content of the visit, the document can still have some errors including those of syntax and sound a like substitutions which may escape proof reading.   In such instances, actual meaning can be extrapolated by contextual diversion      Electronically signed by Abisai Anand DO, Pama Lords on 2/1/2023 at 7:28 AM

## 2023-02-02 ENCOUNTER — TELEPHONE (OUTPATIENT)
Dept: ORTHOPEDIC SURGERY | Age: 43
End: 2023-02-02

## 2023-03-09 ENCOUNTER — OFFICE VISIT (OUTPATIENT)
Dept: FAMILY MEDICINE CLINIC | Age: 43
End: 2023-03-09

## 2023-03-09 VITALS
SYSTOLIC BLOOD PRESSURE: 114 MMHG | HEIGHT: 62 IN | DIASTOLIC BLOOD PRESSURE: 75 MMHG | BODY MASS INDEX: 37.61 KG/M2 | HEART RATE: 90 BPM | WEIGHT: 204.4 LBS

## 2023-03-09 DIAGNOSIS — E66.01 SEVERE OBESITY (BMI 35.0-39.9) WITH COMORBIDITY (HCC): ICD-10-CM

## 2023-03-09 DIAGNOSIS — I10 ESSENTIAL HYPERTENSION: ICD-10-CM

## 2023-03-09 DIAGNOSIS — G56.03 BILATERAL CARPAL TUNNEL SYNDROME: Primary | ICD-10-CM

## 2023-03-09 RX ORDER — AMLODIPINE BESYLATE 10 MG/1
10 TABLET ORAL DAILY
Qty: 30 TABLET | Refills: 5 | Status: SHIPPED | OUTPATIENT
Start: 2023-03-09

## 2023-03-09 SDOH — ECONOMIC STABILITY: FOOD INSECURITY: WITHIN THE PAST 12 MONTHS, YOU WORRIED THAT YOUR FOOD WOULD RUN OUT BEFORE YOU GOT MONEY TO BUY MORE.: NEVER TRUE

## 2023-03-09 SDOH — ECONOMIC STABILITY: INCOME INSECURITY: HOW HARD IS IT FOR YOU TO PAY FOR THE VERY BASICS LIKE FOOD, HOUSING, MEDICAL CARE, AND HEATING?: NOT HARD AT ALL

## 2023-03-09 SDOH — ECONOMIC STABILITY: FOOD INSECURITY: WITHIN THE PAST 12 MONTHS, THE FOOD YOU BOUGHT JUST DIDN'T LAST AND YOU DIDN'T HAVE MONEY TO GET MORE.: NEVER TRUE

## 2023-03-09 SDOH — ECONOMIC STABILITY: HOUSING INSECURITY
IN THE LAST 12 MONTHS, WAS THERE A TIME WHEN YOU DID NOT HAVE A STEADY PLACE TO SLEEP OR SLEPT IN A SHELTER (INCLUDING NOW)?: NO

## 2023-03-09 ASSESSMENT — ENCOUNTER SYMPTOMS
EYE DISCHARGE: 0
DIARRHEA: 0
COUGH: 0
SORE THROAT: 0
SHORTNESS OF BREATH: 0
ABDOMINAL PAIN: 0
RHINORRHEA: 0
CONSTIPATION: 0
NAUSEA: 0
VOMITING: 0

## 2023-03-09 ASSESSMENT — PATIENT HEALTH QUESTIONNAIRE - PHQ9
SUM OF ALL RESPONSES TO PHQ QUESTIONS 1-9: 0
1. LITTLE INTEREST OR PLEASURE IN DOING THINGS: 0
2. FEELING DOWN, DEPRESSED OR HOPELESS: 0
SUM OF ALL RESPONSES TO PHQ QUESTIONS 1-9: 0
SUM OF ALL RESPONSES TO PHQ9 QUESTIONS 1 & 2: 0

## 2023-03-09 NOTE — PROGRESS NOTES
Attending Physician Statement  I  have discussed the care of Oj Jenkins including pertinent history and exam findings with the resident. I agree with the assessment, plan and orders as documented by the resident. /75 (Site: Left Upper Arm, Position: Sitting)   Pulse 90   Ht 5' 2\" (1.575 m)   Wt 204 lb 6.4 oz (92.7 kg)   LMP 05/17/2015 Comment: blood hcg negative today  BMI 37.39 kg/m²    BP Readings from Last 3 Encounters:   03/09/23 114/75   12/20/22 130/84   08/15/22 132/86     Wt Readings from Last 3 Encounters:   03/09/23 204 lb 6.4 oz (92.7 kg)   01/30/23 204 lb (92.5 kg)   12/20/22 203 lb 12.8 oz (92.4 kg)          Diagnosis Orders   1. Bilateral carpal tunnel syndrome  Nerve conduction test      2. Severe obesity (BMI 35.0-39. 9) with comorbidity ([address]        3.  Essential hypertension  amLODIPine (NORVASC) 10 MG tablet          Anna Marie Tellez DO 3/9/2023 12:04 PM
Visit Information    Have you changed or started any medications since your last visit including any over-the-counter medicines, vitamins, or herbal medicines? no   Are you having any side effects from any of your medications? -  no  Have you stopped taking any of your medications? Is so, why? -  no    Have you seen any other physician or provider since your last visit? No  Have you had any other diagnostic tests since your last visit? No  Have you been seen in the emergency room and/or had an admission to a hospital since we last saw you? No  Have you had your routine dental cleaning in the past 6 months? no    Have you activated your Read[employer] account? If not, what are your barriers?  No:      Patient Care Team:  Kasi Knowles MD as PCP - General (Emergency Medicine)  Norah Cabrera DO as PCP - Empaneled Provider  Jitendra Purcell MD as Consulting Physician (Obstetrics & Gynecology)    Medical History Review  Past Medical, Family, and Social History reviewed and does not contribute to the patient presenting condition    Health Maintenance   Topic Date Due    COVID-19 Vaccine (1) Never done    Varicella vaccine (1 of 2 - 2-dose childhood series) Never done    Flu vaccine (1) 08/01/2022    Depression Screen  07/11/2023    DTaP/Tdap/Td vaccine (2 - Td or Tdap) 03/17/2025    Lipids  09/21/2026    Pneumococcal 0-64 years Vaccine  Completed    Hepatitis C screen  Completed    HIV screen  Completed    Hepatitis A vaccine  Aged Out    Hib vaccine  Aged Out    Meningococcal (ACWY) vaccine  Aged Out
abdominal pain, constipation, diarrhea, nausea and vomiting. Endocrine: Negative for polyuria. Genitourinary:  Negative for dysuria. Musculoskeletal:  Negative for arthralgias. Skin:  Negative for rash. Neurological:  Positive for weakness and numbness. Negative for dizziness, light-headedness and headaches. Psychiatric/Behavioral:  Negative for agitation. Objective   Physical Exam  Vitals reviewed. Constitutional:       General: She is not in acute distress. Appearance: Normal appearance. She is obese. Cardiovascular:      Rate and Rhythm: Normal rate and regular rhythm. Heart sounds: Normal heart sounds. No murmur heard. No friction rub. Pulmonary:      Breath sounds: Normal breath sounds. No wheezing or rales. Musculoskeletal:         General: No swelling or tenderness. Normal range of motion. Cervical back: Normal range of motion. Comments: Positive Tinel and phalen test bilateral wrist.     Skin:     Findings: No rash. Neurological:      Mental Status: She is alert and oriented to person, place, and time. Psychiatric:         Mood and Affect: Mood normal.                An electronic signature was used to authenticate this note.     --Cassy Harding MD

## 2023-03-09 NOTE — PATIENT INSTRUCTIONS
Thank you for letting us take care of you today. We hope all your questions were addressed. If a question was overlooked or something else comes to mind after you return home, please contact a member of your Care Team listed below. Your Care Team at [place] [address] is Team #2  Devora Zavala DO (Faculty)  Isaias Garcia (Faculty)  Maria Esther Recinos MD (Resident)  Marcelino Mendez MD (Resident)  Luciano Zaidi MD (Resident)  Funmi Coleman MD (Resident)  Torsten Brunson., KAVEH Eaton.,  DIONY Alvarez., MERRICKN  Cyrus Bottoms., [facility] office)  Lynn Rodriguez, [address] (Clinical Practice Manager)  Tanner Le, [address] [place] [address] (Clinical Pharmacist)     Office phone number: [phone]    If you need to get in right away due to illness, please be advised we have \"Same Day\" appointments available Monday-Friday. Please call us at [phone] option #3 to schedule your \"Same Day\" appointment.

## 2023-10-02 ENCOUNTER — HOSPITAL ENCOUNTER (OUTPATIENT)
Dept: NEUROLOGY | Age: 43
Discharge: HOME | End: 2023-10-02
Payer: COMMERCIAL

## 2023-10-02 PROCEDURE — 95886 MUSC TEST DONE W/N TEST COMP: CPT | Performed by: PHYSICAL MEDICINE & REHABILITATION

## 2023-10-02 PROCEDURE — 95910 NRV CNDJ TEST 7-8 STUDIES: CPT | Performed by: PHYSICAL MEDICINE & REHABILITATION

## 2023-10-03 DIAGNOSIS — G56.03 BILATERAL CARPAL TUNNEL SYNDROME: ICD-10-CM

## 2023-12-04 ENCOUNTER — OFFICE VISIT (OUTPATIENT)
Dept: FAMILY MEDICINE CLINIC | Age: 43
End: 2023-12-04
Payer: COMMERCIAL

## 2023-12-04 VITALS
HEIGHT: 63 IN | WEIGHT: 194 LBS | SYSTOLIC BLOOD PRESSURE: 134 MMHG | HEART RATE: 72 BPM | BODY MASS INDEX: 34.38 KG/M2 | DIASTOLIC BLOOD PRESSURE: 96 MMHG

## 2023-12-04 DIAGNOSIS — I10 ESSENTIAL HYPERTENSION: ICD-10-CM

## 2023-12-04 DIAGNOSIS — E66.01 SEVERE OBESITY (BMI 35.0-39.9) WITH COMORBIDITY (HCC): ICD-10-CM

## 2023-12-04 DIAGNOSIS — G56.03 BILATERAL CARPAL TUNNEL SYNDROME: Primary | ICD-10-CM

## 2023-12-04 DIAGNOSIS — F17.210 CIGARETTE NICOTINE DEPENDENCE WITHOUT COMPLICATION: ICD-10-CM

## 2023-12-04 PROCEDURE — 99213 OFFICE O/P EST LOW 20 MIN: CPT

## 2023-12-04 PROCEDURE — 3074F SYST BP LT 130 MM HG: CPT

## 2023-12-04 PROCEDURE — 3078F DIAST BP <80 MM HG: CPT

## 2023-12-04 RX ORDER — AMLODIPINE BESYLATE 10 MG/1
10 TABLET ORAL DAILY
Qty: 30 TABLET | Refills: 5 | Status: SHIPPED | OUTPATIENT
Start: 2023-12-04

## 2023-12-04 RX ORDER — VARENICLINE TARTRATE 0.5 MG/1
.5-1 TABLET, FILM COATED ORAL SEE ADMIN INSTRUCTIONS
Qty: 57 TABLET | Refills: 0 | Status: SHIPPED | OUTPATIENT
Start: 2023-12-04

## 2023-12-04 RX ORDER — LISINOPRIL AND HYDROCHLOROTHIAZIDE 20; 12.5 MG/1; MG/1
TABLET ORAL
Qty: 30 TABLET | Refills: 5 | Status: SHIPPED | OUTPATIENT
Start: 2023-12-04

## 2023-12-04 ASSESSMENT — ENCOUNTER SYMPTOMS
WHEEZING: 0
PHOTOPHOBIA: 0
STRIDOR: 0
APNEA: 0
CHEST TIGHTNESS: 0
ABDOMINAL PAIN: 0
CHOKING: 0
SHORTNESS OF BREATH: 0
COUGH: 0

## 2023-12-04 NOTE — PROGRESS NOTES
[facility] Medicine Residency Program - Outpatient Note      Subjective:    Shawn Hudson is a 44 y.o. female with  has a past medical history of Abdominal pain, Bilateral carpal tunnel syndrome, Fibroids, HTN (hypertension), Menometrorrhagia, and Obesity (BMI 30.0-34.9). Presented to the office today for:  Chief Complaint   Patient presents with    Hypertension     Pt states in need of BP check and to discuss headaches she gets when taking medications, curios if there's an alternative she can try    Forms     Pt has FMLA paperwork to be addressed       HPI  Patient is a 45 yo female with Pmhx of HTN and Chronic Carpal tunnel syndrome who is presenting for BP follow up and also presenting for FMLA form work. Patient states has persistent frontal sided headache intermittently for approx 3-4 months. Patient describes episodes lasting minutes but describes worsening after taking Amlodipine. Patient denies any dizziness, visual acuity change, chest pain, SOB, palpitations or change in urine output. Patient also does describe slightly increased fatigue and sleepiness but unsure vs decreased sleep or increased work schedule. States has been told described she snores but denies being told she stopped breathing. Patient is active cigarette smoking but admits she wishes to stop. States compliant with her medications         Review of Systems   Constitutional:  Positive for fatigue. Negative for activity change, appetite change, chills, diaphoresis and fever. Eyes:  Negative for photophobia and visual disturbance. Respiratory:  Negative for apnea, cough, choking, chest tightness, shortness of breath, wheezing and stridor. Cardiovascular:  Negative for chest pain, palpitations and leg swelling. Gastrointestinal:  Negative for abdominal pain. Genitourinary:  Negative for decreased urine volume, dysuria and frequency. Neurological:  Positive for headaches.  Negative for dizziness,

## 2023-12-04 NOTE — PROGRESS NOTES
Visit Information    Have you changed or started any medications since your last visit including any over-the-counter medicines, vitamins, or herbal medicines? no   Have you stopped taking any of your medications? Is so, why? -  no  Are you having any side effects from any of your medications? - yes - headaches when taking BP medications    Have you seen any other physician or provider since your last visit?  no   Have you had any other diagnostic tests since your last visit? yes - EMG 10/2/23   Have you been seen in the emergency room and/or had an admission in a hospital since we last saw you?  no   Have you had your routine dental cleaning in the past 6 months?  no     Do you have an active MyChart account? If no, what is the barrier?   No    Patient Care Team:  Tangela Plata MD as PCP - General (Emergency Medicine)  Mae Maldonado DO as PCP - Empaneled Provider  Hansel White MD as Consulting Physician (Obstetrics & Gynecology)    Medical History Review  Past Medical, Family, and Social History reviewed and does contribute to the patient presenting condition    Health Maintenance   Topic Date Due    Hepatitis B vaccine (1 of 3 - 3-dose series) Never done    COVID-19 Vaccine (1) Never done    Varicella vaccine (1 of 2 - 2-dose childhood series) Never done    Pneumococcal 0-64 years Vaccine (2 - PCV) 02/20/2019    Flu vaccine (1) 08/01/2023    Depression Screen  03/09/2024    DTaP/Tdap/Td vaccine (2 - Td or Tdap) 03/17/2025    Lipids  09/21/2026    Hepatitis C screen  Completed    HIV screen  Completed    Hepatitis A vaccine  Aged Out    Hib vaccine  Aged Out    HPV vaccine  Aged Out    Meningococcal (ACWY) vaccine  Aged Out    Cervical cancer screen  Discontinued

## 2024-02-26 ENCOUNTER — OFFICE VISIT (OUTPATIENT)
Dept: FAMILY MEDICINE CLINIC | Age: 44
End: 2024-02-26
Payer: COMMERCIAL

## 2024-02-26 VITALS
HEART RATE: 81 BPM | SYSTOLIC BLOOD PRESSURE: 130 MMHG | DIASTOLIC BLOOD PRESSURE: 81 MMHG | HEIGHT: 63 IN | WEIGHT: 205.4 LBS | BODY MASS INDEX: 36.39 KG/M2 | OXYGEN SATURATION: 97 %

## 2024-02-26 DIAGNOSIS — Z72.0 TOBACCO USE: ICD-10-CM

## 2024-02-26 DIAGNOSIS — I10 ESSENTIAL HYPERTENSION: ICD-10-CM

## 2024-02-26 PROCEDURE — 3079F DIAST BP 80-89 MM HG: CPT

## 2024-02-26 PROCEDURE — 99214 OFFICE O/P EST MOD 30 MIN: CPT

## 2024-02-26 PROCEDURE — 3075F SYST BP GE 130 - 139MM HG: CPT

## 2024-02-26 RX ORDER — LOSARTAN POTASSIUM AND HYDROCHLOROTHIAZIDE 12.5; 5 MG/1; MG/1
1 TABLET ORAL DAILY
Qty: 90 TABLET | Refills: 1 | Status: SHIPPED | OUTPATIENT
Start: 2024-02-26

## 2024-02-26 RX ORDER — AMLODIPINE BESYLATE 10 MG/1
10 TABLET ORAL DAILY
Qty: 30 TABLET | Refills: 5 | Status: SHIPPED | OUTPATIENT
Start: 2024-02-26

## 2024-02-26 RX ORDER — NICOTINE 21 MG/24HR
1 PATCH, TRANSDERMAL 24 HOURS TRANSDERMAL EVERY 24 HOURS
Qty: 30 PATCH | Refills: 5 | Status: SHIPPED | OUTPATIENT
Start: 2024-02-26 | End: 2024-08-24

## 2024-02-26 RX ORDER — LISINOPRIL AND HYDROCHLOROTHIAZIDE 20; 12.5 MG/1; MG/1
TABLET ORAL
Qty: 30 TABLET | Refills: 5 | Status: CANCELLED | OUTPATIENT
Start: 2024-02-26

## 2024-02-26 ASSESSMENT — PATIENT HEALTH QUESTIONNAIRE - PHQ9
SUM OF ALL RESPONSES TO PHQ9 QUESTIONS 1 & 2: 0
1. LITTLE INTEREST OR PLEASURE IN DOING THINGS: 0
SUM OF ALL RESPONSES TO PHQ QUESTIONS 1-9: 0
2. FEELING DOWN, DEPRESSED OR HOPELESS: 0
SUM OF ALL RESPONSES TO PHQ QUESTIONS 1-9: 0

## 2024-02-26 ASSESSMENT — ENCOUNTER SYMPTOMS
STRIDOR: 0
SHORTNESS OF BREATH: 0
COUGH: 0

## 2024-02-26 NOTE — PATIENT INSTRUCTIONS
Thank you for letting us take care of you today. We hope all your questions were addressed. If a question was overlooked or something else comes to mind after you return home, please contact a member of your Care Team listed below.        Your Care Team at [facility] is Team #5  Marleni Dwyer MD (Faculty)  Ashlyn Felder MD (Resident)  Alda Camara MD (Resident)  Gaivn Ny MD (Resident)  Lucila Kendrick MD, (Resident)  Samantha STEPHENS, ZARA Garcia,  MERRICKN  Yadira STEPHENS, Desi OSWALD, Nohemi STEPHENS (Front office)  ZARA Florence (Clinical Practice Manager)  Thalia Reed RP (Clinical Pharmacist)       Office phone number: [phone]    If you need to get in right away due to illness, please be advised we have \"Same Day\" appointments available Monday-Friday. Please call us at [phone] option #3 to schedule your \"Same Day\" appointment.

## 2024-02-26 NOTE — PROGRESS NOTES
Attending Physician Statement  I  have discussed the care of Cici Lawson including pertinent history and exam findings with the resident. I agree with the assessment, plan and orders as documented by the resident.      /81 (Site: Left Upper Arm, Position: Sitting, Cuff Size: Medium Adult)   Pulse 81   Ht 1.6 m (5' 3\")   Wt 93.2 kg (205 lb 6.4 oz)   LMP 05/17/2015 Comment: blood hcg negative today  SpO2 97%   BMI 36.38 kg/m²    BP Readings from Last 3 Encounters:   02/26/24 130/81   12/04/23 (!) 134/96   03/09/23 114/75     Wt Readings from Last 3 Encounters:   02/26/24 93.2 kg (205 lb 6.4 oz)   12/04/23 88 kg (194 lb)   03/09/23 92.7 kg (204 lb 6.4 oz)          Diagnosis Orders   1. Essential hypertension  amLODIPine (NORVASC) 10 MG tablet    losartan-hydroCHLOROthiazide (HYZAAR) 50-12.5 MG per tablet      2. Tobacco use  nicotine (NICODERM CQ) 14 MG/24HR        HTN follow up- not tolerating lisinopril will switch to losartan. BP controlled.    Carpel tunnel syndrome- stable, most recent EMG 2023 wnl. Continue PT prn.    Tobacco use- interested in quitting, not interested in chantix, will attempt patches and down titrate as tolerated.     Not interested in vaccines.     FMLA form filled. PRN tylneol/motrin. BMP pending.      Yan Harris DO 2/26/2024 3:04 PM      
HYPERTENSION visit     BP Readings from Last 3 Encounters:   12/04/23 (!) 134/96   03/09/23 114/75   12/20/22 130/84       LDL Cholesterol (mg/dL)   Date Value   09/21/2021 109     HDL (mg/dL)   Date Value   09/21/2021 65     BUN (mg/dL)   Date Value   09/21/2021 14     Creatinine (mg/dL)   Date Value   09/21/2021 0.56     Glucose (mg/dL)   Date Value   09/21/2021 83              Have you changed or started any medications since your last visit including any over-the-counter medicines, vitamins, or herbal medicines? no   Have you stopped taking any of your medications? Is so, why? -  no  Are you having any side effects from any of your medications? - no  How often do you miss doses of your medication? no      Have you seen any other physician or provider since your last visit?  no   Have you had any other diagnostic tests since your last visit?  no   Have you been seen in the emergency room and/or had an admission in a hospital since we last saw you?  yes - Urgent care    Have you had your routine dental cleaning in the past 6 months?  no     Do you have an active MyChart account? If no, what is the barrier?  No: pending     Patient Care Team:  Woo Gaxiola MD as PCP - General (Family Medicine)  Butch Woody DO as PCP - Empaneled Provider  Arleth Ramirez MD as Consulting Physician (Obstetrics & Gynecology)    Medical History Review  Past Medical, Family, and Social History reviewed and does not contribute to the patient presenting condition    Health Maintenance   Topic Date Due    Hepatitis B vaccine (1 of 3 - 3-dose series) Never done    COVID-19 Vaccine (1) Never done    Varicella vaccine (1 of 2 - 2-dose childhood series) Never done    Pneumococcal 0-64 years Vaccine (2 - PCV) 02/20/2019    Flu vaccine (1) 08/01/2023    Depression Screen  03/09/2024    DTaP/Tdap/Td vaccine (2 - Td or Tdap) 03/17/2025    Lipids  09/21/2026    Hepatitis C screen  Completed    HIV screen  Completed    Hepatitis A 
needed analgesics  -Continue to follow with physical therapy and use wrist brace as needed  -FMLA form already filled out    Requested Prescriptions      No prescriptions requested or ordered in this encounter       There are no discontinued medications.    Cici received counseling on the following healthy behaviors: nutrition, exercise and medication adherence    Discussed use,benefit, and side effects of prescribed medications.  Barriers to medication compliance addressed.      All patient questions answered.  Pt voiced understanding.     No follow-ups on file.        Disclaimer: Some orall of this note was transcribed using voice-recognition software.This may cause typographical errors occasionally. Although all effort is made to fix these errors, please do not hesitate to contact our office if there isany concern with the understanding of this note.

## 2024-12-30 ENCOUNTER — TELEPHONE (OUTPATIENT)
Dept: FAMILY MEDICINE CLINIC | Age: 44
End: 2024-12-30

## 2024-12-30 ENCOUNTER — OFFICE VISIT (OUTPATIENT)
Dept: FAMILY MEDICINE CLINIC | Age: 44
End: 2024-12-30
Payer: COMMERCIAL

## 2024-12-30 VITALS
WEIGHT: 196.8 LBS | HEART RATE: 77 BPM | HEIGHT: 63 IN | SYSTOLIC BLOOD PRESSURE: 129 MMHG | BODY MASS INDEX: 34.87 KG/M2 | DIASTOLIC BLOOD PRESSURE: 87 MMHG

## 2024-12-30 DIAGNOSIS — Z12.31 ENCOUNTER FOR SCREENING MAMMOGRAM FOR BREAST CANCER: ICD-10-CM

## 2024-12-30 DIAGNOSIS — G56.03 BILATERAL CARPAL TUNNEL SYNDROME: Primary | ICD-10-CM

## 2024-12-30 DIAGNOSIS — Z72.0 TOBACCO USE: ICD-10-CM

## 2024-12-30 DIAGNOSIS — I10 ESSENTIAL HYPERTENSION: ICD-10-CM

## 2024-12-30 PROCEDURE — 99213 OFFICE O/P EST LOW 20 MIN: CPT

## 2024-12-30 PROCEDURE — 3079F DIAST BP 80-89 MM HG: CPT

## 2024-12-30 PROCEDURE — 3074F SYST BP LT 130 MM HG: CPT

## 2024-12-30 RX ORDER — NICOTINE 21 MG/24HR
1 PATCH, TRANSDERMAL 24 HOURS TRANSDERMAL EVERY 24 HOURS
Qty: 30 PATCH | Refills: 5 | Status: SHIPPED | OUTPATIENT
Start: 2024-12-30 | End: 2025-06-28

## 2024-12-30 RX ORDER — ACETAMINOPHEN 500 MG
TABLET ORAL
Qty: 120 TABLET | Refills: 1 | Status: SHIPPED | OUTPATIENT
Start: 2024-12-30

## 2024-12-30 SDOH — ECONOMIC STABILITY: FOOD INSECURITY: WITHIN THE PAST 12 MONTHS, YOU WORRIED THAT YOUR FOOD WOULD RUN OUT BEFORE YOU GOT MONEY TO BUY MORE.: SOMETIMES TRUE

## 2024-12-30 SDOH — ECONOMIC STABILITY: FOOD INSECURITY: WITHIN THE PAST 12 MONTHS, THE FOOD YOU BOUGHT JUST DIDN'T LAST AND YOU DIDN'T HAVE MONEY TO GET MORE.: SOMETIMES TRUE

## 2024-12-30 SDOH — ECONOMIC STABILITY: INCOME INSECURITY: HOW HARD IS IT FOR YOU TO PAY FOR THE VERY BASICS LIKE FOOD, HOUSING, MEDICAL CARE, AND HEATING?: SOMEWHAT HARD

## 2024-12-30 ASSESSMENT — PATIENT HEALTH QUESTIONNAIRE - PHQ9
SUM OF ALL RESPONSES TO PHQ QUESTIONS 1-9: 0
SUM OF ALL RESPONSES TO PHQ QUESTIONS 1-9: 0
2. FEELING DOWN, DEPRESSED OR HOPELESS: NOT AT ALL
SUM OF ALL RESPONSES TO PHQ9 QUESTIONS 1 & 2: 0
SUM OF ALL RESPONSES TO PHQ QUESTIONS 1-9: 0
SUM OF ALL RESPONSES TO PHQ QUESTIONS 1-9: 0
1. LITTLE INTEREST OR PLEASURE IN DOING THINGS: NOT AT ALL

## 2024-12-30 ASSESSMENT — ENCOUNTER SYMPTOMS
COUGH: 0
CHEST TIGHTNESS: 0
CHOKING: 0

## 2024-12-30 NOTE — PATIENT INSTRUCTIONS
[facility] Food Resources*  (Call [facility] for more resources)        [facility] [employer] [facility]  What they offer: Food pantry second and fourth Tuesday 4:30-6pm  Phone Number and Address: [phone] Toll Free: [employer], between [employer] and [employer]; [address] [address]  [facility] Office on [facility]  What they offer: “Meals & Nutrition” search option on website  Phone Number and Website: 489.964.9139; https://[employer]/  [facility] [employer]  What they offer: Dinner is open to all (must be registered and in good standing) and three hot meals a day for shelter residents. Breakfast 7-8am, Lunch 12-1pm, and Dinner 5-6pm daily.  Phone and Address: [phone]; [address] [address]  [employer] Delivery program  What they offer: Food Box Delivery for those within [facility] who are homebound or without transportation. Applications done by phone. Qualifying individuals are eligible for 3 deliveries for the lifetime of the program.  Phone number: Call for eligibility check at 2-1-1 or 4-617-828[place] (6982)  [facility]  What they offer: Food Pantry second and fourth Saturday 1-5pm  Phone and Address: [phone]; [address] [address]  [employer] Mobile Food Pantries   What they offer: Mobile pantries throughout the [facility]. Anyone in need may visit one pantry per month.  Phone Number and Website: For locations and schedule call 2-1-1 or 9-949-141-HELP (0003) or check the website www.feedtoledo.org  [employer] [place] [employer] Char[employer]  What they offer: Food Pantry, call for schedule, open to All  Phone Number: [phone]  [facility]  What they offer: Hot meals, Breakfast: Monday, Wednesday, Friday 8-10am, Lunch: Monday-Friday 10am-12:30pm. Food pantry (serves [address] or east of [facility])

## 2024-12-30 NOTE — PROGRESS NOTES
Attending Physician Statement  I  have discussed the care of Cici Lawson including pertinent history and exam findings with the resident. I agree with the assessment, plan and orders as documented by the resident.      /87 (Site: Right Upper Arm, Position: Sitting, Cuff Size: Medium Adult)   Pulse 77   Ht 1.6 m (5' 2.99\")   Wt 89.3 kg (196 lb 12.8 oz)   LMP 05/17/2015 Comment: blood hcg negative today  BMI 34.87 kg/m²    BP Readings from Last 3 Encounters:   12/30/24 129/87   02/26/24 130/81   12/04/23 (!) 134/96     Wt Readings from Last 3 Encounters:   12/30/24 89.3 kg (196 lb 12.8 oz)   02/26/24 93.2 kg (205 lb 6.4 oz)   12/04/23 88 kg (194 lb)          Diagnosis Orders   1. Bilateral carpal tunnel syndrome  acetaminophen (TYLENOL) 500 MG tablet    Comprehensive Metabolic Panel      2. Essential hypertension  Comprehensive Metabolic Panel    CBC with Auto Differential    Hemoglobin A1C    TSH reflex to FT4      3. Encounter for screening mammogram for breast cancer  JESSIE Digital Screen Bilateral [[id number]]    Comprehensive Metabolic Panel      4. Tobacco use  nicotine (NICODERM CQ) 14 MG/24HR    nicotine polacrilex (NICORETTE) 2 MG gum    Comprehensive Metabolic Panel              Butch Woody DO 12/30/2024 8:58 AM      
Visit Information    Have you changed or started any medications since your last visit including any over-the-counter medicines, vitamins, or herbal medicines? no   Are you having any side effects from any of your medications? -  no  Have you stopped taking any of your medications? Is so, why? -  no    Have you seen any other physician or provider since your last visit? No  Have you had any other diagnostic tests since your last visit? No  Have you been seen in the emergency room and/or had an admission to a hospital since we last saw you? No  Have you had your routine dental cleaning in the past 6 months? yes -     Have you activated your [employer] account? If not, what are your barriers? Yes     Patient Care Team:  Woo Gaxiola MD as PCP - General (Family Medicine)  Butch Woody DO as PCP - Empaneled Provider  Arleth Ramirez MD as Consulting Physician (Obstetrics & Gynecology)    Medical History Review  Past Medical, Family, and Social History reviewed and does not contribute to the patient presenting condition    Health Maintenance   Topic Date Due    Varicella vaccine (1 of 2 - 13+ 2-dose series) Never done    Hepatitis B vaccine (1 of 3 - 19+ 3-dose series) Never done    Diabetes screen  01/23/2016    Pneumococcal 0-64 years Vaccine (2 of 2 - PCV) 02/20/2019    Breast cancer screen  Never done    Flu vaccine (1) 08/01/2024    COVID-19 Vaccine (1 - 2023-24 season) Never done    Depression Screen  02/26/2025    DTaP/Tdap/Td vaccine (2 - Td or Tdap) 03/17/2025    Lipids  09/21/2026    Hepatitis C screen  Completed    HIV screen  Completed    Hepatitis A vaccine  Aged Out    Hib vaccine  Aged Out    HPV vaccine  Aged Out    Polio vaccine  Aged Out    Meningococcal (ACWY) vaccine  Aged Out    Cervical cancer screen  Discontinued       
agreeable to maintain the same amount of hours per month    2.  HTN  -Blood pressure seems to be well-controlled  -Patient states did not previously like losartan hydrochlorothiazide due to side effects switch to amlodipine patient is more tolerant of it we will continue        Requested Prescriptions      No prescriptions requested or ordered in this encounter       There are no discontinued medications.    Cici received counseling on the following healthy behaviors: nutrition, exercise and medication adherence    Discussed use,benefit, and side effects of prescribed medications.  Barriers to medication compliance addressed.      All patient questions answered.  Pt voiced understanding.     No follow-ups on file.        Disclaimer: Some orall of this note was transcribed using voice-recognition software.This may cause typographical errors occasionally. Although all effort is made to fix these errors, please do not hesitate to contact our office if there isany concern with the understanding of this note.

## 2024-12-30 NOTE — TELEPHONE ENCOUNTER
Received blank FMLA for [employer]. Scanned into chart blank. KATHY and FMLA Policy signed and scanned into chart.

## 2025-01-02 NOTE — TELEPHONE ENCOUNTER
Received completed FMLA for [employer] on 12/30/24. Faxed back and scanned into chart with confirmation. Patient was informed.

## 2025-03-25 DIAGNOSIS — I10 ESSENTIAL HYPERTENSION: ICD-10-CM

## 2025-03-25 RX ORDER — AMLODIPINE BESYLATE 10 MG/1
10 TABLET ORAL DAILY
Qty: 30 TABLET | Refills: 5 | Status: SHIPPED | OUTPATIENT
Start: 2025-03-25

## 2025-03-25 NOTE — TELEPHONE ENCOUNTER
Last visit: 12/30/24  Last Med refill: 2/26/24  Does patient have enough medication for 72 hours: no    Next Visit Date:  No future appointments.    Health Maintenance   Topic Date Due    Varicella vaccine (1 of 2 - 13+ 2-dose series) Never done    Hepatitis B vaccine (1 of 3 - 19+ 3-dose series) Never done    Diabetes screen  01/23/2016    Pneumococcal 0-49 years Vaccine (2 of 2 - PCV) 02/20/2019    Breast cancer screen  Never done    Flu vaccine (1) 08/01/2024    COVID-19 Vaccine (1 - 2024-25 season) Never done    DTaP/Tdap/Td vaccine (2 - Td or Tdap) 03/17/2025    Depression Screen  12/30/2025    Lipids  09/21/2026    Hepatitis C screen  Completed    HIV screen  Completed    Hepatitis A vaccine  Aged Out    Hib vaccine  Aged Out    HPV vaccine  Aged Out    Polio vaccine  Aged Out    Meningococcal (ACWY) vaccine  Aged Out    Meningococcal B vaccine  Aged Out    Cervical cancer screen  Discontinued       Hemoglobin A1C (%)   Date Value   08/12/2014 5.3             ( goal A1C is < 7)   No components found for: \"LABMICR\"  No components found for: \"LDLCHOLESTEROL\", \"LDLCALC\"    (goal LDL is <100)   AST (U/L)   Date Value   02/06/2015 15     ALT (U/L)   Date Value   02/06/2015 18     BUN (mg/dL)   Date Value   09/21/2021 14     BP Readings from Last 3 Encounters:   12/30/24 129/87   02/26/24 130/81   12/04/23 (!) 134/96          (goal 120/80)    All Future Testing planned in [facility]  Lab Frequency Next Occurrence   JESSIE Digital Screen Bilateral [[id number]] Once 01/06/2025   Comprehensive Metabolic Panel Once 12/30/2024   CBC with Auto Differential Once 12/30/2024   Hemoglobin A1C Once 12/30/2024   TSH reflex to FT4 Once 12/30/2024               Patient Active Problem List:     Fibroids     Dysmenorrhea     Menometrorrhagia     Essential hypertension     Tobacco use     Obesity (BMI 30.0-34.9)     Enlarged uterus     Uterine Polyps     Abnormal uterine bleeding (AUB)     Hypokalemia     Endometriosis, stage 4